# Patient Record
Sex: MALE | HISPANIC OR LATINO | Employment: FULL TIME | URBAN - METROPOLITAN AREA
[De-identification: names, ages, dates, MRNs, and addresses within clinical notes are randomized per-mention and may not be internally consistent; named-entity substitution may affect disease eponyms.]

---

## 2017-02-08 ENCOUNTER — HOSPITAL ENCOUNTER (OUTPATIENT)
Dept: LAB | Facility: MEDICAL CENTER | Age: 57
End: 2017-02-08
Attending: FAMILY MEDICINE
Payer: COMMERCIAL

## 2017-02-08 LAB
ALBUMIN SERPL BCP-MCNC: 4.2 G/DL (ref 3.2–4.9)
ALBUMIN/GLOB SERPL: 1.7 G/DL
ALP SERPL-CCNC: 51 U/L (ref 30–99)
ALT SERPL-CCNC: 31 U/L (ref 2–50)
ANION GAP SERPL CALC-SCNC: 6 MMOL/L (ref 0–11.9)
AST SERPL-CCNC: 27 U/L (ref 12–45)
BASOPHILS # BLD AUTO: 0.05 K/UL (ref 0–0.12)
BASOPHILS NFR BLD AUTO: 0.7 % (ref 0–1.8)
BILIRUB SERPL-MCNC: 0.3 MG/DL (ref 0.1–1.5)
BUN SERPL-MCNC: 25 MG/DL (ref 8–22)
CALCIUM SERPL-MCNC: 9.6 MG/DL (ref 8.5–10.5)
CHLORIDE SERPL-SCNC: 103 MMOL/L (ref 96–112)
CHOLEST SERPL-MCNC: 162 MG/DL (ref 100–199)
CO2 SERPL-SCNC: 26 MMOL/L (ref 20–33)
CREAT SERPL-MCNC: 0.99 MG/DL (ref 0.5–1.4)
EOSINOPHIL # BLD: 0.35 K/UL (ref 0–0.51)
EOSINOPHIL NFR BLD AUTO: 4.7 % (ref 0–6.9)
ERYTHROCYTE [DISTWIDTH] IN BLOOD BY AUTOMATED COUNT: 43.6 FL (ref 35.9–50)
GLOBULIN SER CALC-MCNC: 2.5 G/DL (ref 1.9–3.5)
GLUCOSE SERPL-MCNC: 107 MG/DL (ref 65–99)
HCT VFR BLD AUTO: 36.2 % (ref 42–52)
HDLC SERPL-MCNC: 51 MG/DL
HGB BLD-MCNC: 11.7 G/DL (ref 14–18)
IMM GRANULOCYTES # BLD AUTO: 0.02 K/UL (ref 0–0.11)
IMM GRANULOCYTES NFR BLD AUTO: 0.3 % (ref 0–0.9)
LDLC SERPL CALC-MCNC: 95 MG/DL
LYMPHOCYTES # BLD: 2.55 K/UL (ref 1–4.8)
LYMPHOCYTES NFR BLD AUTO: 34 % (ref 22–41)
MCH RBC QN AUTO: 29.3 PG (ref 27–33)
MCHC RBC AUTO-ENTMCNC: 32.3 G/DL (ref 33.7–35.3)
MCV RBC AUTO: 90.7 FL (ref 81.4–97.8)
MONOCYTES # BLD: 0.52 K/UL (ref 0–0.85)
MONOCYTES NFR BLD AUTO: 6.9 % (ref 0–13.4)
NEUTROPHILS # BLD: 4.02 K/UL (ref 1.82–7.42)
NEUTROPHILS NFR BLD AUTO: 53.4 % (ref 44–72)
NRBC # BLD AUTO: 0 K/UL
NRBC BLD-RTO: 0 /100 WBC
PLATELET # BLD AUTO: 244 K/UL (ref 164–446)
PMV BLD AUTO: 12.5 FL (ref 9–12.9)
POTASSIUM SERPL-SCNC: 4.1 MMOL/L (ref 3.6–5.5)
PROT SERPL-MCNC: 6.7 G/DL (ref 6–8.2)
RBC # BLD AUTO: 3.99 M/UL (ref 4.7–6.1)
SODIUM SERPL-SCNC: 135 MMOL/L (ref 135–145)
TRIGL SERPL-MCNC: 81 MG/DL (ref 0–149)
WBC # BLD AUTO: 7.5 K/UL (ref 4.8–10.8)

## 2017-02-08 PROCEDURE — 85025 COMPLETE CBC W/AUTO DIFF WBC: CPT

## 2017-02-08 PROCEDURE — 80061 LIPID PANEL: CPT

## 2017-02-08 PROCEDURE — 36415 COLL VENOUS BLD VENIPUNCTURE: CPT

## 2017-02-08 PROCEDURE — 80053 COMPREHEN METABOLIC PANEL: CPT

## 2017-08-15 ENCOUNTER — APPOINTMENT (OUTPATIENT)
Dept: RADIOLOGY | Facility: MEDICAL CENTER | Age: 57
End: 2017-08-15
Attending: EMERGENCY MEDICINE
Payer: COMMERCIAL

## 2017-08-15 ENCOUNTER — HOSPITAL ENCOUNTER (EMERGENCY)
Facility: MEDICAL CENTER | Age: 57
End: 2017-08-15
Attending: EMERGENCY MEDICINE
Payer: COMMERCIAL

## 2017-08-15 VITALS
HEART RATE: 76 BPM | HEIGHT: 67 IN | RESPIRATION RATE: 14 BRPM | DIASTOLIC BLOOD PRESSURE: 87 MMHG | TEMPERATURE: 98.2 F | OXYGEN SATURATION: 99 % | BODY MASS INDEX: 27.4 KG/M2 | WEIGHT: 174.6 LBS | SYSTOLIC BLOOD PRESSURE: 159 MMHG

## 2017-08-15 DIAGNOSIS — S81.012A LACERATION OF KNEE, LEFT, INITIAL ENCOUNTER: ICD-10-CM
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
## 2017-08-15 DIAGNOSIS — S40.812A ABRASION OF ARM, LEFT, INITIAL ENCOUNTER: ICD-10-CM

## 2017-08-15 DIAGNOSIS — S09.90XA CHI (CLOSED HEAD INJURY), INITIAL ENCOUNTER: ICD-10-CM

## 2017-08-15 DIAGNOSIS — S01.01XA LACERATION OF SCALP, INITIAL ENCOUNTER: ICD-10-CM

## 2017-08-15 LAB — GLUCOSE BLD-MCNC: 138 MG/DL (ref 65–99)

## 2017-08-15 PROCEDURE — 700111 HCHG RX REV CODE 636 W/ 250 OVERRIDE (IP): Performed by: EMERGENCY MEDICINE

## 2017-08-15 PROCEDURE — 303747 HCHG EXTRA SUTURE

## 2017-08-15 PROCEDURE — 303485 HCHG DRESSING MEDIUM

## 2017-08-15 PROCEDURE — 700102 HCHG RX REV CODE 250 W/ 637 OVERRIDE(OP): Performed by: EMERGENCY MEDICINE

## 2017-08-15 PROCEDURE — 700101 HCHG RX REV CODE 250: Performed by: EMERGENCY MEDICINE

## 2017-08-15 PROCEDURE — 73590 X-RAY EXAM OF LOWER LEG: CPT | Mod: LT

## 2017-08-15 PROCEDURE — 82962 GLUCOSE BLOOD TEST: CPT

## 2017-08-15 PROCEDURE — 73030 X-RAY EXAM OF SHOULDER: CPT | Mod: RT

## 2017-08-15 PROCEDURE — A9270 NON-COVERED ITEM OR SERVICE: HCPCS | Performed by: EMERGENCY MEDICINE

## 2017-08-15 PROCEDURE — 70450 CT HEAD/BRAIN W/O DYE: CPT

## 2017-08-15 PROCEDURE — 500002 HCHG ADHESIVE, DERMABOND: Performed by: EMERGENCY MEDICINE

## 2017-08-15 PROCEDURE — 90471 IMMUNIZATION ADMIN: CPT

## 2017-08-15 PROCEDURE — 90715 TDAP VACCINE 7 YRS/> IM: CPT | Performed by: EMERGENCY MEDICINE

## 2017-08-15 PROCEDURE — 99284 EMERGENCY DEPT VISIT MOD MDM: CPT

## 2017-08-15 PROCEDURE — 304999 HCHG REPAIR-SIMPLE/INTERMED LEVEL 1

## 2017-08-15 PROCEDURE — 304217 HCHG IRRIGATION SYSTEM

## 2017-08-15 RX ORDER — HYDROCODONE BITARTRATE AND ACETAMINOPHEN 5; 325 MG/1; MG/1
1 TABLET ORAL EVERY 4 HOURS PRN
Qty: 20 TAB | Refills: 0 | Status: SHIPPED | OUTPATIENT
Start: 2017-08-15 | End: 2021-09-09

## 2017-08-15 RX ORDER — ONDANSETRON 4 MG/1
4 TABLET, ORALLY DISINTEGRATING ORAL EVERY 8 HOURS PRN
Qty: 10 TAB | Refills: 0 | Status: SHIPPED | OUTPATIENT
Start: 2017-08-15 | End: 2021-09-09

## 2017-08-15 RX ORDER — LIDOCAINE HYDROCHLORIDE 10 MG/ML
20 INJECTION, SOLUTION INFILTRATION; PERINEURAL ONCE
Status: COMPLETED | OUTPATIENT
Start: 2017-08-15 | End: 2017-08-15

## 2017-08-15 RX ORDER — HYDROCODONE BITARTRATE AND ACETAMINOPHEN 5; 325 MG/1; MG/1
1 TABLET ORAL ONCE
Status: COMPLETED | OUTPATIENT
Start: 2017-08-15 | End: 2017-08-15

## 2017-08-15 RX ADMIN — CLOSTRIDIUM TETANI TOXOID ANTIGEN (FORMALDEHYDE INACTIVATED), CORYNEBACTERIUM DIPHTHERIAE TOXOID ANTIGEN (FORMALDEHYDE INACTIVATED), BORDETELLA PERTUSSIS TOXOID ANTIGEN (GLUTARALDEHYDE INACTIVATED), BORDETELLA PERTUSSIS FILAMENTOUS HEMAGGLUTININ ANTIGEN (FORMALDEHYDE INACTIVATED), BORDETELLA PERTUSSIS PERTACTIN ANTIGEN, AND BORDETELLA PERTUSSIS FIMBRIAE 2/3 ANTIGEN 0.5 ML: 5; 2; 2.5; 5; 3; 5 INJECTION, SUSPENSION INTRAMUSCULAR at 20:09

## 2017-08-15 RX ADMIN — LIDOCAINE HYDROCHLORIDE 20 ML: 10 INJECTION, SOLUTION INFILTRATION; PERINEURAL at 19:45

## 2017-08-15 RX ADMIN — HYDROCODONE BITARTRATE AND ACETAMINOPHEN 1 TABLET: 5; 325 TABLET ORAL at 20:09

## 2017-08-15 NOTE — ED AVS SNAPSHOT
Dedicated Devices Access Code: [email or web address]  Expires: 9/14/2017  8:40 PM    Your email address is not on file at [facility].  Email Addresses are required for you to sign up for [employer]icated Devices, please contact [phone] to verify your personal information and to provide your email address prior to attempting to register for Dedicated Devices.    Odilon Gan  [address] [place] [address]    Dedicated Devices  A secure, online tool to manage your health information     [facility]’s Dedicated Devices® is a secure, online tool that connects you to your personalized health information from the privacy of your home -- day or night - making it very easy for you to manage your healthcare. Once the activation process is completed, you can even access your medical information using the Dedicated Devices rachel, which is available for free in the Apple Rachel store or Google Play store.     To learn more about Dedicated Devices, visit [email or web address]    There are two levels of access available (as shown below):   My Chart Features  [facility] Primary Care Doctor [facility]  Specialists [facility]  Urgent  Care Non-[facility] Primary Care Doctor   Email your healthcare team securely and privately 24/7 X X X    Manage appointments: schedule your next appointment; view details of past/upcoming appointments X      Request prescription refills. X      View recent personal medical records, including lab and immunizations X X X X   View health record, including health history, allergies, medications X X X X   Read reports about your outpatient visits, procedures, consult and ER notes X X X X   See your discharge summary, which is a recap of your hospital and/or ER visit that includes your diagnosis, lab results, and care plan X X  X     How to register for [employer]:  Once your e-mail address has been verified, follow the following steps to sign up for [employer].     1. Go to  [email or web address]  2. Click on the Sign Up Now box, which takes you to the New Member Sign Up page. You will need  to provide the following information:  a. Enter your [employer] Access Code exactly as it appears at the top of this page. (You will not need to use this code after you’ve completed the sign-up process. If you do not sign up before the expiration date, you must request a new code.)   b. Enter your date of birth.   c. Enter your home email address.   d. Click Submit, and follow the next screen’s instructions.  3. Create a [employer] ID. This will be your [employer] login ID and cannot be changed, so think of one that is secure and easy to remember.  4. Create a [employer] password. You can change your password at any time.  5. Enter your Password Reset Question and Answer. This can be used at a later time if you forget your password.   6. Enter your e-mail address. This allows you to receive e-mail notifications when new information is available in [employer].  7. Click Sign Up. You can now view your health information.    For assistance activating your [employer] account, call [phone]

## 2017-08-15 NOTE — ED AVS SNAPSHOT
Home Care Instructions                                                                                                                Odilon Gan   MRN: [id number]    Department:  [facility], Emergency Dept   Date of Visit:  8/15/2017            [facility], Emergency Dept    [address]    [place] [address]    Phone:  [phone]      You were seen by     Renetta Wasserman M.D.      Your Diagnosis Was     Fall, initial encounter     W19.XXXA       These are the medications you received during your hospitalization from 08/15/2017 1846 to 08/15/2017 2040     Date/Time Order Dose Route Action    08/15/2017 1945 lidocaine (XYLOCAINE) 1 % injection 20 mL Infiltration Given    08/15/2017 2009 tetanus-dipth-acell pertussis (ADACEL) inj 0.5 mL 0.5 mL Intramuscular Given    08/15/2017 2009 hydrocodone-acetaminophen (NORCO) 5-325 MG per tablet 1 Tab 1 Tab Oral Given      Follow-up Information     1. Follow up with Michael Rowe M.D.. Call in 2 days.    Specialty:  Family Medicine    Why:  for recheck    Contact information    [address] [facility] [address] [phone]        Medication Information     Review all of your home medications and newly ordered medications with your primary doctor and/or pharmacist as soon as possible. Follow medication instructions as directed by your doctor and/or pharmacist.     Please keep your complete medication list with you and share with your physician. Update the information when medications are discontinued, doses are changed, or new medications (including over-the-counter products) are added; and carry medication information at all times in the event of emergency situations.               Medication List      START taking these medications        Instructions    Morning Afternoon Evening Bedtime    hydrocodone-acetaminophen 5-325 MG Tabs per tablet   Last time this was given:  1 Tab on 8/15/2017  8:09 PM   Commonly known as:  NORCO        Take 1 Tab by mouth every four hours as needed.   Dose:  1 Tab                        ondansetron 4 MG Tbdp   Commonly known as:  ZOFRAN ODT        Take 1 Tab by mouth every 8 hours as needed for Nausea/Vomiting.   Dose:  4 mg                          ASK your doctor about these medications        Instructions    Morning Afternoon Evening Bedtime    aspirin effervescent 325 MG Tbef   Commonly known as:  EVGENY-SELTZER        Take 1 Tab by mouth every 6 hours as needed.   Dose:  1 Tab                        LEVEMIR 100 UNIT/ML Soln   Generic drug:  insulin detemir        Inject 10 Units as instructed every evening.   Dose:  10 Units                        lisinopril 20 MG Tabs   Commonly known as:  PRINIVIL        Take 20 mg by mouth every day.   Dose:  20 mg                        lorazepam 1 MG Tabs   Commonly known as:  ATIVAN        Take 1 Tab by mouth every four hours as needed (vertigo).   Dose:  1 mg                        meclizine 25 MG Tabs   Commonly known as:  ANTIVERT        Take 1 Tab by mouth 3 times a day as needed for Dizziness, Nausea/Vomiting and Vertigo.   Dose:  25 mg                        NOVOLIN L SC        Inject  as instructed.                        promethazine 25 MG Tabs   Commonly known as:  PHENERGAN        Take 1 Tab by mouth every 6 hours as needed for Nausea/Vomiting.   Dose:  25 mg                        simvastatin 80 MG tablet   Commonly known as:  ZOCOR        Take 80 mg by mouth every evening.   Dose:  80 mg                             Where to Get Your Medications      You can get these medications from any pharmacy     Bring a paper prescription for each of these medications    - hydrocodone-acetaminophen 5-325 MG Tabs per tablet  - ondansetron 4 MG Tbdp            Procedures and tests performed during your visit     ACCU-CHEK GLUCOSE    ADHESIVE, DERMABOND    CT-HEAD W/O    DISPOSABLE SUTURE SET    DX-SHOULDER 2+ RIGHT    DX-TIBIA AND FIBULA LEFT    WOUND IRRIGATION             Discharge Instructions       Conmoción cerebral - Adultos  (Concussion, Adult)  Calin conmoción cerebral, o traumatismo cerebral cerrado, es calin lesión cerebral causada por un golpe directo en la kevyn o por un movimiento rápido y brusco (sacudida) de la kevyn o el petrona. Generalmente no pone en peligro la kae. Aún así, los efectos de calin conmoción cerebral pueden ser graves. Si sufrió calin conmoción cerebral antes, es más probable que experimente síntomas similares a calin conmoción cerebral después de un golpe directo en la kevyn.   CAUSAS   · Un golpe directo en la kevyn, dorothy al chocar contra otro jugador en un partido de fútbol, recibir un golpe en calin kolby o golpearse la kevyn contra calin superficie dura.  · Calin sacudida de la kevyn o el petrona que hace que el cerebro se mueva hacia adelante y hacia atrás dentro del cráneo, dorothy en un choque automovilístico.  SIGNOS Y SÍNTOMAS   Los signos de calin conmoción cerebral pueden ser difíciles de determinar. En un primer momento, los pacientes, familiares y profesionales cuco vez no los adviertan. Puede ser que aparentemente esté normal willa que actúe o se sienta diferente.  Por lo general, los síntomas son transitorios willa pueden durar algunos días, semanas o aún más. Algunos síntomas pueden aparecer inmediatamente mientras otros pueden manifestarse después de algunas horas o días. Cada lesión en la kevyn es diferente. Los síntomas son:   · Dolor de kevyn leve a moderado, que no se jonny.  · Sensación de presión dentro de la kevyn.  · Presentar más dificultad que lo habitual para:  · Aprender o recordar cosas que ha escuchado.  · Responder preguntas.  · Prestar atención o concentrarse.  · Organizar las tareas diarias.  · Arvin decisiones y resolver problemas.  · Lentitud para pensar, actuar o reaccionar, hablar o leer.  · Sentirse perdido o confundirse con facilidad.  · Sentirse cansado todo el tiempo o con falta de energía (fatigado).  · Sentirse  somnoliento.  · Trastornos del sueño.  · Dormir más que lo habitual.  · Dormir menos que lo habitual.  · Problemas para conciliar el sueño.  · Problemas para dormir (insomnio).  · Pérdida del equilibrio, sensación de mareo.  · Náuseas o vómitos.  · Adormecimiento u hormigueo.  · Mayor sensibilidad para:  · Los sonidos.  · Las luces.  · Distracciones.  · Problemas visuales o cansancio en los ojos.  · Disminución del sentido del gusto o el olfato.  · Zumbidos en los oídos.  · Cambios de humor, dorothy sentirse yas o ansioso.  · Irritación o enojo por cosas pequeñas o sin motivos.  · Falta de motivación.  · Omar o escuchar cosas que otras personas no jensen ni escuchan (alucinaciones).  DIAGNÓSTICO   El médico diagnosticará calin conmoción cerebral basándose en la descripción de la lesión y los síntomas. Le preguntará si se desmayó (perdió el conocimiento) y si tiene dificultad para recordar los sucesos ocurridos inmediatamente antes y christian el traumatismo.   La evaluación también puede incluir:   · Un escaneo cerebral para encontrar signos de lesión cerebral. Aunque los estudios no muestren lesiones, igual puede zenaida sufrido calin conmoción cerebral.  · Análisis de jaxon para asegurarse de que no hay otros problemas.  TRATAMIENTO   · La mayor parte de las conmociones se tratan en el servicio de emergencias, el área de atención de urgencia o el consultorio médico. Es posible que deba permanecer en el hospital christian la noche para completar el tratamiento.  · Informe a giraldo médico si masha medicamentos, incluidos los medicamentos recetados, medicamentos de venta hedy y cami naturales. Algunos medicamentos, dorothy los anticoagulantes y la aspirina, pueden aumentar la probabilidad de sufrir complicaciones. También informe a giraldo médico si ha bebido alcohol o consume drogas. Esta información puede afectar el tratamiento.  · El médico le dará el christiana con instrucciones importantes que deberá seguir.  · La velocidad de  recuperación de calin conmoción cerebral depende de varios factores. Entre ellos se incluyen la gravedad de la conmoción cerebral, la cinthia del cerebro lesionada, la edad y el estado de dennys previo a la conmoción cerebral.  · La mayoría de las personas que rodriguez sufrido calin lesión leve se recuperan completamente. La recuperación puede llevar tiempo. En general, la recuperación es más lenta en las personas mayores. Además, a aquellas personas que ya rodriguez sufrido calin conmoción cerebral en el pasado les llevará más tiempo recuperarse de la lesión actual.  INSTRUCCIONES PARA EL CUIDADO EN EL HOGAR   Instrucciones generales  · Siga cuidadosamente las indicaciones que giraldo médico le ha dado.  · Utilice los medicamentos de venta hedy o recetados para calmar el dolor, el malestar o la fiebre, según se lo indique el médico.  · [place] sólo los medicamentos que giraldo médico le haya autorizado.  · No amy alcohol hasta que giraldo médico lo autorice. El alcohol y algunas otras sustancias pueden demorar giraldo recuperación y ponerlo en riesgo de nuevas lesiones.  · Si le resulta más difícil que lo habitual recordar las cosas, escríbalas.  · Trate de hacer calin cosa por vez si se distrae con facilidad. Por ejemplo, no juju televisión mientras prepara la lizette.  · Consulte con familiares y amigos si debe keith decisiones importantes.  · Cumpla con todas las visitas de control. Se recomienda realizar varias evaluaciones de los síntomas para favorecer giraldo recuperación.  · Controle noelle síntomas y pídale a otras personas que también lo raquel. En algunos casos pueden aparecer complicaciones luego de calin conmoción cerebral. Los adultos mayores con lesión cerebral pueden tener un mayor riesgo de complicaciones graves, dorothy un coágulo sanguíneo en el cerebro.  · Informe a los maestros, el departamento de enfermería de la escuela, el consejero escolar, el entrenador o director acerca de giraldo lesión, los síntomas y las restricciones que tiene. Hágales saber lo que  puede y lo que no puede hacer. Ellos deberán observar:  ¨ Aumento en los problemas de atención o concentración.  ¨ Más dificultades para recordar o aprender información nueva.  ¨ Aumento del tiempo que necesita para completar tareas o encargos.  ¨ Aumento de la irritabilidad o disminución de la capacidad para enfrentar el estrés.  ¨ Aparición de nuevos síntomas.  · Mikael reposo. El descanso favorece la curación del cerebro. Asegúrese de que:  ¨ [place] yamil por la noche. Evite quedarse despierto muy tarde por la noche.  ¨ Se va a dormir a la misma hora los días de semana y los fines de semana.  ¨ Descanse christian el día. Stefanie siestas christian el día o momentos de descanso cuando se siente cansado.  · Limita las actividades que requieren mucha atención o concentración. Estas pueden ser:  ¨ Tareas para el hogar o trabajos relacionados con el empleo.  ¨ Mirar televisión.  ¨ Trabajar en la computadora.  · Evite toda situación en la que se pudiera producir otra lesión cerebral (fútbol americano, hockey, fútbol, básquet, artes marciales, deportes de henrry natalio abajo y andar a collin). El problema empeorará cada vez que experimente calin conmoción cerebral. Debe evitar estas actividades hasta que sea evaluado por el médico correspondiente.  Regreso a las actividades habituales  Debe volver a noelle actividades habituales gradualmente, no de calin rekha vez. Debe darles al cuerpo y el cerebro giraldo tiempo para recuperarse.  · No retome la práctica de deportes ni otras actividades deportivas hasta que giraldo médico le diga que puede hacerlo.  · Consulte a giraldo médico sobre cuándo puede conducir automóviles, andar en bicicleta u operar máquinas pesadas. La capacidad para reaccionar puede ser más lenta luego de cailn lesión cerebral. Nunca realice estas actividades si se siente mareado.  · Pregunte a giraldo médico cuando podrá volver a la escuela o al trabajo.  Prevención de otra conmoción cerebral  Es muy importante que evite otra lesión cerebral,  especialmente antes de que se haya recuperado. En casos raros, calin nueva lesión puede causar daños cerebrales permanentes, hinchazón del cerebro o la muerte. El riesgo es mayor christian los primeros 7 a 10 días después de calin lesión en la kevyn. Evite las lesiones:   · Use el cinturón de seguridad al conducir un automóvil.  · [place] alcohol solo con moderación.  · Use un adan cuando tonja en bicicleta, esquíe, patine o realice actividades similares.  · Evite actividades que podrían causar calin segunda conmoción cerebral, dorothy deportes de contacto o recreativos hasta que giraldo médico lo autorice.  · Implemente medidas de seguridad en el hogar.  ¨ Evite el desorden y objetos que puedan ser peligrosos en pisos y escaleras.  ¨ Coloque barras en los yovana y pasamanos en las escaleras.  ¨ Ponga alfombras antideslizantes en pisos y bañeras.  ¨ Mejore la iluminación en zonas de penumbra.  SOLICITE ATENCIÓN MÉDICA SI:   · Aumentan noelle problemas de atención o concentración.  · Aumentan noelle dificultades para recordar o aprender información nueva.  · Necesita más tiempo que antes para completar las tareas o asignaciones.  · Aumenta la irritabilidad o disminuye la capacidad para enfrentar el estrés.  · Tiene más síntomas que antes.  Solicite atención médica si presenta alguno de los siguientes síntomas christian más de 2 semanas después de giraldo lesión:   · Dolor de kevyn que perdura (crónico).  · Mareos o problemas con el equilibrio.  · Náuseas.  · Problemas de visión.  · Aumento de la sensibilidad a los ruidos o la aleida.  · Depresión y cambios en el estado de ánimo.  · Ansiedad o irritabilidad.  · Problemas de memoria.  · Dificultad para concentrarse o mantener la atención.  · Problemas para dormir.  · Cansancio permanente.  SOLICITE ATENCIÓN MÉDICA DE INMEDIATO SI:   · Los shima de kevyn son intensos o empeoran. [place] puede ser un signo de que hay un coágulo sanguíneo en la kevyn.  · Siente debilidad (aun si es solo en calin mano, calin  pierna o parte del pearl).  · Siente entumecimiento.  · Le falta coordinación.  · Vomita repetidas veces.  · Está más somnoliento.  · Calin pupila está más carolann que la otra.  · Tiene convulsiones.  · Tiene dificultad para hablar.  · Siente calin confusión que va en aumento. [place] puede ser un signo de que hay un coágulo sanguíneo en el cerebro.  · Aumenta la intranquilidad, la agitación o la irritabilidad.  · No puede reconocer personas o lugares.  · Siente dolor en el petrona.  · Le resulta difícil despertarse.  · Tiene cambios de conducta inusuales.  · Pierde la conciencia.  ASEGÚRESE DE QUE:   · Comprende estas instrucciones.  · Controlará giraldo afección.  · Recibirá ayuda de inmediato si no mejora o si empeora.     Esta información no tiene dorothy fin reemplazar el consejo del médico. Asegúrese de hacerle al médico cualquier pregunta que tenga.     Document Released: 11/30/2009 Document Revised: 01/08/2016  [employer] Interactive Patient Education ©2016 [employer]    Cuidado de un desgarro en los adultos  (Laceration Care, Adult)  Un desgarro es un halley que atraviesa todas las capas de piel. El halley también llega al tejido que está debajo de la piel. Algunos carrera cicatrizan por sí solos. Otros se deben cerrar con puntos (suturas), grapas, tiras adhesivas para la piel o adhesivo para heridas. El cuidado del halley reduce el riesgo de infección y ayuda a calin mejor cicatrización.  CÓMO CUIDAR DEL HALLEY  Para los puntos o las grapas, maria g lo siguiente:  · Mantenga la herida limpia y seca.  · Si le colocaron calin venda (vendaje), debe cambiarla al menos calin vez al día o dorothy se lo haya indicado el médico. También debe cambiarla si se moja o se ensucia.  · Mantenga la herida completamente seca christian las primeras 24 horas o dorothy se lo haya indicado el médico. Transcurrido christopher tiempo, puede ducharse o keith un baño de inmersión. No obstante, asegúrese de no sumergir la herida en agua hasta que le hayan quitado los puntos o  las grapas.  · Limpie la herida calin vez al día o dorothy se lo haya indicado el médico:  ¨ Lave la herida con agua y jabón.  ¨ Enjuague la herida con agua hasta sacar todo el jabón.  ¨ Seque dando palmaditas con calin toalla limpia. No frote la herida.  · Después de limpiar la herida, aplique calin capa delgada de ungüento con antibiótico dorothy se lo haya indicado el médico. El ungüento se aplica con estos fines:  ¨ Ayuda a prevenir calin infección.  ¨ [place] que la venda se adhiera a la herida.  · Concurra para que le retiren los puntos cuando el médico le indique.  Si el médico utilizó tiras adhesivas:   · Mantenga la herida limpia y seca.  · Si le colocaron calin venda, debe cambiarla al menos calin vez al día o dorothy se lo haya indicado el médico. También debe cambiarla si se ensucia o se moja.  · No deje que las tiras adhesivas se mojen. Puede bañarse o ducharse, willa tenga cuidado de no mojar la herida.  · Si se moja, séquela dando palmaditas con calin toalla limpia. No frote la herida.  · Las tiras adhesivas se caen solas. Puede recortar las tiras a medida que la herida cicatriza. No quite las tiras que aún están pegadas a la herida. Se caerán después de un tiempo.  Si el médico utilizó adhesivo para heridas:  · Trate de mantener la herida seca; sin embargo, puede mojarla ligeramente cuando se bañe o se duche. No sumerja la herida en el agua, por ejemplo, al nadar.  · Después de ducharse o bañarse, seque la herida con cuidado dando palmaditas con calin toalla limpia. No frote la herida.  · No practique actividades que lo raquel transpirar mucho hasta que el adhesivo se haya salido solo.  · No aplique líquidos, cremas ni ungüentos medicinales en la herida mientras esté el adhesivo.  · Si le colocaron calin venda, debe cambiarla al menos calin vez al día o dorothy se lo haya indicado el médico. También debe cambiarla si se ensucia o se moja.  · Si le colocan calin venda sobre la herida, no deje que la cinta de la venda toque el  adhesivo.  · No toque el adhesivo. El adhesivo suele permanecer en la piel de 5 a 10 días. Luego, se sale solo.  Instrucciones generales   · Para ayudar a evitar la formación de cicatrices, cúbrase la herida con pantalla solar siempre que esté al aire hedy después de que le hayan retirado los puntos o las tiras adhesivas o cuando todavía tenga el adhesivo en la piel y la herida haya cicatrizado. Use calin pantalla solar con factor de protección solar (FPS) de por lo menos 30.  · [place] los medicamentos de venta hedy y los recetados solamente dorothy se lo haya indicado el médico.  · Si le indicaron un ungüento o un medicamento con antibiótico, aplíquelo o tómelo dorothy se lo haya dicho el médico. No deje de usar el antibiótico aunque la herida esté mejorando.  · No se rasque ni se toque la herida.  · Concurra a todas las visitas de control dorothy se lo haya indicado el médico. [place] es importante.  · Controle la herida todos los días para detectar signos de infección. Esté atento a lo siguiente:  ¨ Dolor, hinchazón o enrojecimiento.  ¨ Líquido, jaxon o pus.  · Cuando esté sentado o acostado, eleve la cinthia de la lesión por encima del nivel del corazón, si es posible.  SOLICITE AYUDA SI:  · Recibió calin vacuna antitetánica y tiene cualquiera de estos problemas en el sitio de la inyección:  ¨ Hinchazón.  ¨ Dolor intenso.  ¨ Enrojecimiento.  ¨ Hemorragia.  · Tiene fiebre.  · La herida estaba cerrada y se abre.  · Percibe que sale mal olor de la herida o de la venda.  · Nota un cuerpo extraño en la herida, dorothy un trozo de peña o chiquita.  · Los medicamentos no le alivian el dolor.  · Tiene más enrojecimiento, hinchazón o dolor en el lugar de la herida.  · Observa líquido, jaxon o pus que salen de la herida.  · Observa que la piel cerca de la herida cambia de color.  · Debe cambiar la venda con frecuencia debido a que hay secreción de líquido, jaxon o pus de la herida.  · Tiene calin erupción cutánea nueva.  · Comienza a tener  entumecimiento alrededor de la herida.  SOLICITE AYUDA DE INMEDIATO SI:  · Hay mucha hinchazón alrededor de la herida.  · El dolor empeora repentinamente y es muy intenso.  · Tiene bultos dolorosos cerca de la herida o en la piel de cualquier parte del cuerpo.  · Tiene calin línea carmen que sale de la herida.  · La herida está en la mano o en el pie y no puede  cherelle los dedos con normalidad.  · La herida está en la mano o en el pie y observa que los dedos tienen un luis pálido o azulado.     Esta información no tiene dorothy fin reemplazar el consejo del médico. Asegúrese de hacerle al médico cualquier pregunta que tenga.     Document Released: 08/15/2012 Document Revised: 05/03/2016  [employer] Interactive Patient Education ©2016 [employer]            Patient Information     Patient Information    Following emergency treatment: all patient requiring follow-up care must return either to a private physician or a clinic if your condition worsens before you are able to obtain further medical attention, please return to the emergency room.     Billing Information    At [facility], we work to make the billing process streamlined for our patients.  Our Representatives are here to answer any questions you may have regarding your hospital bill.  If you have insurance coverage and have supplied your insurance information to us, we will submit a claim to your insurer on your behalf.  Should you have any questions regarding your bill, we can be reached online or by phone as follows:  Online: You are able pay your bills online or live chat with our representatives about any billing questions you may have. We are here to help Monday - Friday from 8:00am to 7:30pm and 9:00am - 12:00pm on Saturdays.  Please visit [email or web address]  for more information.   Phone:  [phone] or [phone]    Please note that your emergency physician, surgeon, pathologist, radiologist, anesthesiologist,  and other specialists are not employed by [facility] and will therefore bill separately for their services.  Please contact them directly for any questions concerning their bills at the numbers below:     Emergency Physician Services:  [phone]  [facility]:  [phone]  [facility]:  [phone]  [facility]:  [phone]    1. Your final bill may vary from the amount quoted upon discharge if all procedures are not complete at that time, or if your doctor has additional procedures of which we are not aware. You will receive an additional bill if you return to the Emergency Department at [facility] for suture removal regardless of the facility of which the sutures were placed.     2. Please arrange for settlement of this account at the emergency registration.    3. All self-pay accounts are due in full at the time of treatment.  If you are unable to meet this obligation then payment is expected within 4-5 days.     4. If you have had radiology studies (CT, X-ray, Ultrasound, MRI), you have received a preliminary result during your emergency department visit. Please contact the radiology department [phone] to receive a copy of your final result. Please discuss the Final result with your primary physician or with the follow up physician provided.     Crisis Hotline:  [place] Crisis Hotline:  0-246-BKLZYJX or 1-421.365.7131  [place] Crisis Hotline:    1-269.898.9771 or 444-861-8815         ED Discharge Follow Up Questions    1. In order to provide you with very good care, we would like to follow up with a phone call in the next few days.  May we have your permission to contact you?     YES /  NO    2. What is the best phone number to call you? (       )_____-__________    3. What is the best time to call you?      Morning  /  Afternoon  /  Evening                   Patient Signature:  ____________________________________________________________    Date:   ____________________________________________________________

## 2017-08-15 NOTE — ED AVS SNAPSHOT
8/15/2017    Odilon Gan  [address] [place] [address]    Dear Odilon:    [facility] wants to ensure your discharge home is safe and you or your loved ones have had all of your questions answered regarding your care after you leave the hospital.    Below is a list of resources and contact information should you have any questions regarding your hospital stay, follow-up instructions, or active medical symptoms.    Questions or Concerns Regarding… Contact   Medical Questions Related to Your Discharge  (7 days a week, 8am-5pm) Contact a Nurse Care Coordinator   [phone]   Medical Questions Not Related to Your Discharge  (24 hours a day / 7 days a week)  Contact the Nurse Health Line   [phone]    Medications or Discharge Instructions Refer to your discharge packet   or contact your [facility] Primary Care Provider   [phone]   Follow-up Appointment(s) Schedule your appointment via [employer]   or contact Scheduling [phone]   Billing Review your statement via [employer]  or contact Billing [phone]   Medical Records Review your records via [employer]   or contact Medical Records [phone]     You may receive a telephone call within two days of discharge. This call is to make certain you understand your discharge instructions and have the opportunity to have any questions answered. You can also easily access your medical information, test results and upcoming appointments via the [employer] free online health management tool. You can learn more and sign up at [employer]. For assistance setting up your [employer] account, please call [phone].    Once again, we want to ensure your discharge home is safe and that you have a clear understanding of any next steps in your care. If you have any questions or concerns, please do not hesitate to contact us, we are here for you. Thank you for choosing [facility] for your healthcare needs.    Sincerely,    Your [facility] Healthcare Team

## 2017-08-16 NOTE — DISCHARGE INSTRUCTIONS
Conmoción cerebral - Adultos  (Concussion, Adult)  Calin conmoción cerebral, o traumatismo cerebral cerrado, es calin lesión cerebral causada por un golpe directo en la kevyn o por un movimiento rápido y brusco (sacudida) de la kevyn o el petrona. Generalmente no pone en peligro la kae. Aún así, los efectos de calin conmoción cerebral pueden ser graves. Si sufrió calin conmoción cerebral antes, es más probable que experimente síntomas similares a calin conmoción cerebral después de un golpe directo en la kevyn.   CAUSAS   · Un golpe directo en la kevyn, dorothy al chocar contra otro jugador en un partido de fútbol, recibir un golpe en calin kolby o golpearse la kevyn contra calin superficie dura.  · Calin sacudida de la kevyn o el petrona que hace que el cerebro se mueva hacia adelante y hacia atrás dentro del cráneo, dorothy en un choque automovilístico.  SIGNOS Y SÍNTOMAS   Los signos de calin conmoción cerebral pueden ser difíciles de determinar. En un primer momento, los pacientes, familiares y profesionales cuco vez no los adviertan. Puede ser que aparentemente esté normal willa que actúe o se sienta diferente.  Por lo general, los síntomas son transitorios willa pueden durar algunos días, semanas o aún más. Algunos síntomas pueden aparecer inmediatamente mientras otros pueden manifestarse después de algunas horas o días. Cada lesión en la kevyn es diferente. Los síntomas son:   · Dolor de kevyn leve a moderado, que no se jonny.  · Sensación de presión dentro de la kevyn.  · Presentar más dificultad que lo habitual para:  · Aprender o recordar cosas que ha escuchado.  · Responder preguntas.  · Prestar atención o concentrarse.  · Organizar las tareas diarias.  · Arvin decisiones y resolver problemas.  · Lentitud para pensar, actuar o reaccionar, hablar o leer.  · Sentirse perdido o confundirse con facilidad.  · Sentirse cansado todo el tiempo o con falta de energía (fatigado).  · Sentirse somnoliento.  · Trastornos del  sueño.  · Dormir más que lo habitual.  · Dormir menos que lo habitual.  · Problemas para conciliar el sueño.  · Problemas para dormir (insomnio).  · Pérdida del equilibrio, sensación de mareo.  · Náuseas o vómitos.  · Adormecimiento u hormigueo.  · Mayor sensibilidad para:  · Los sonidos.  · Las luces.  · Distracciones.  · Problemas visuales o cansancio en los ojos.  · Disminución del sentido del gusto o el olfato.  · Zumbidos en los oídos.  · Cambios de humor, dorothy sentirse yas o ansioso.  · Irritación o enojo por cosas pequeñas o sin motivos.  · Falta de motivación.  · Omar o escuchar cosas que otras personas no jensen ni escuchan (alucinaciones).  DIAGNÓSTICO   El médico diagnosticará calin conmoción cerebral basándose en la descripción de la lesión y los síntomas. Le preguntará si se desmayó (perdió el conocimiento) y si tiene dificultad para recordar los sucesos ocurridos inmediatamente antes y christian el traumatismo.   La evaluación también puede incluir:   · Un escaneo cerebral para encontrar signos de lesión cerebral. Aunque los estudios no muestren lesiones, igual puede zenaida sufrido calin conmoción cerebral.  · Análisis de jaxon para asegurarse de que no hay otros problemas.  TRATAMIENTO   · La mayor parte de las conmociones se tratan en el servicio de emergencias, el área de atención de urgencia o el consultorio médico. Es posible que deba permanecer en el hospital christian la noche para completar el tratamiento.  · Informe a giraldo médico si masha medicamentos, incluidos los medicamentos recetados, medicamentos de venta hedy y cami naturales. Algunos medicamentos, dorothy los anticoagulantes y la aspirina, pueden aumentar la probabilidad de sufrir complicaciones. También informe a giraldo médico si ha bebido alcohol o consume drogas. Esta información puede afectar el tratamiento.  · El médico le dará el christiana con instrucciones importantes que deberá seguir.  · La velocidad de recuperación de calin conmoción cerebral  depende de varios factores. Entre ellos se incluyen la gravedad de la conmoción cerebral, la cinthia del cerebro lesionada, la edad y el estado de dennys previo a la conmoción cerebral.  · La mayoría de las personas que rodriguez sufrido calin lesión leve se recuperan completamente. La recuperación puede llevar tiempo. En general, la recuperación es más lenta en las personas mayores. Además, a aquellas personas que ya rodriguez sufrido calin conmoción cerebral en el pasado les llevará más tiempo recuperarse de la lesión actual.  INSTRUCCIONES PARA EL CUIDADO EN EL HOGAR   Instrucciones generales  · Siga cuidadosamente las indicaciones que giraldo médico le ha dado.  · Utilice los medicamentos de venta hedy o recetados para calmar el dolor, el malestar o la fiebre, según se lo indique el médico.  · [employer] sólo los medicamentos que giraldo médico le haya autorizado.  · No amy alcohol hasta que giraldo médico lo autorice. El alcohol y algunas otras sustancias pueden demorar giraldo recuperación y ponerlo en riesgo de nuevas lesiones.  · Si le resulta más difícil que lo habitual recordar las cosas, escríbalas.  · Trate de hacer calin cosa por vez si se distrae con facilidad. Por ejemplo, no juju televisión mientras prepara la lizette.  · Consulte con familiares y amigos si debe keith decisiones importantes.  · Cumpla con todas las visitas de control. Se recomienda realizar varias evaluaciones de los síntomas para favorecer giraldo recuperación.  · Controle noelle síntomas y pídale a otras personas que también lo raquel. En algunos casos pueden aparecer complicaciones luego de calin conmoción cerebral. Los adultos mayores con lesión cerebral pueden tener un mayor riesgo de complicaciones graves, dorothy un coágulo sanguíneo en el cerebro.  · Informe a los maestros, el departamento de enfermería de la escuela, el consejero escolar, el entrenador o director acerca de giraldo lesión, los síntomas y las restricciones que tiene. Hágales saber lo que puede y lo que no puede hacer. Ellos  deberán observar:  ¨ Aumento en los problemas de atención o concentración.  ¨ Más dificultades para recordar o aprender información nueva.  ¨ Aumento del tiempo que necesita para completar tareas o encargos.  ¨ Aumento de la irritabilidad o disminución de la capacidad para enfrentar el estrés.  ¨ Aparición de nuevos síntomas.  · Mikael reposo. El descanso favorece la curación del cerebro. Asegúrese de que:  ¨ Duerme yamil por la noche. Evite quedarse despierto muy tarde por la noche.  ¨ Se va a dormir a la misma hora los días de semana y los fines de semana.  ¨ Descanse christian el día. Stefanie siestas christian el día o momentos de descanso cuando se siente cansado.  · Limita las actividades que requieren mucha atención o concentración. Estas pueden ser:  ¨ Tareas para el hogar o trabajos relacionados con el empleo.  ¨ Mirar televisión.  ¨ Trabajar en la computadora.  · Evite toda situación en la que se pudiera producir otra lesión cerebral (fútbol americano, hockey, fútbol, básquet, artes marciales, deportes de henrry natalio abajo y andar a collin). El problema empeorará cada vez que experimente calin conmoción cerebral. Debe evitar estas actividades hasta que sea evaluado por el médico correspondiente.  Regreso a las actividades habituales  Debe volver a noelle actividades habituales gradualmente, no de calin rekha vez. Debe darles al cuerpo y el cerebro giraldo tiempo para recuperarse.  · No retome la práctica de deportes ni otras actividades deportivas hasta que giraldo médico le diga que puede hacerlo.  · Consulte a giraldo médico sobre cuándo puede conducir automóviles, andar en bicicleta u operar máquinas pesadas. La capacidad para reaccionar puede ser más lenta luego de calin lesión cerebral. Nunca realice estas actividades si se siente mareado.  · Pregunte a giraldo médico cuando podrá volver a la escuela o al trabajo.  Prevención de otra conmoción cerebral  Es muy importante que evite otra lesión cerebral, especialmente antes de que se haya  recuperado. En casos raros, calin nueva lesión puede causar daños cerebrales permanentes, hinchazón del cerebro o la muerte. El riesgo es mayor christian los primeros 7 a 10 días después de calin lesión en la kevyn. Evite las lesiones:   · Use el cinturón de seguridad al conducir un automóvil.  · Xiomara alcohol solo con moderación.  · Use un adan cuando tonja en bicicleta, esquíe, patine o realice actividades similares.  · Evite actividades que podrían causar calin segunda conmoción cerebral, dorothy deportes de contacto o recreativos hasta que giraldo médico lo autorice.  · Implemente medidas de seguridad en el hogar.  ¨ Evite el desorden y objetos que puedan ser peligrosos en pisos y escaleras.  ¨ Coloque barras en los yovana y pasamanos en las escaleras.  ¨ Ponga alfombras antideslizantes en pisos y bañeras.  ¨ Mejore la iluminación en zonas de penumbra.  SOLICITE ATENCIÓN MÉDICA SI:   · Aumentan noelle problemas de atención o concentración.  · Aumentan noelle dificultades para recordar o aprender información nueva.  · Necesita más tiempo que antes para completar las tareas o asignaciones.  · Aumenta la irritabilidad o disminuye la capacidad para enfrentar el estrés.  · Tiene más síntomas que antes.  Solicite atención médica si presenta alguno de los siguientes síntomas christian más de 2 semanas después de giraldo lesión:   · Dolor de kevyn que perdura (crónico).  · Mareos o problemas con el equilibrio.  · Náuseas.  · Problemas de visión.  · Aumento de la sensibilidad a los ruidos o la aleida.  · Depresión y cambios en el estado de ánimo.  · Ansiedad o irritabilidad.  · Problemas de memoria.  · Dificultad para concentrarse o mantener la atención.  · Problemas para dormir.  · Cansancio permanente.  SOLICITE ATENCIÓN MÉDICA DE INMEDIATO SI:   · Los shima de kevyn son intensos o empeoran. Lantana puede ser un signo de que hay un coágulo sanguíneo en la kevyn.  · Siente debilidad (aun si es solo en calin mano, calin pierna o parte del  pearl).  · Siente entumecimiento.  · Le falta coordinación.  · Vomita repetidas veces.  · Está más somnoliento.  · Calin pupila está más carolann que la otra.  · Tiene convulsiones.  · Tiene dificultad para hablar.  · Siente calin confusión que va en aumento. [place] puede ser un signo de que hay un coágulo sanguíneo en el cerebro.  · Aumenta la intranquilidad, la agitación o la irritabilidad.  · No puede reconocer personas o lugares.  · Siente dolor en el petrona.  · Le resulta difícil despertarse.  · Tiene cambios de conducta inusuales.  · Pierde la conciencia.  ASEGÚRESE DE QUE:   · Comprende estas instrucciones.  · Controlará giraldo afección.  · Recibirá ayuda de inmediato si no mejora o si empeora.     Esta información no tiene dorothy fin reemplazar el consejo del médico. Asegúrese de hacerle al médico cualquier pregunta que tenga.     Document Released: 11/30/2009 Document Revised: 01/08/2016  [employer] Interactive Patient Education ©2016 [employer]    Cuidado de un desgarro en los adultos  (Laceration Care, Adult)  Un desgarro es un halley que atraviesa todas las capas de piel. El halley también llega al tejido que está debajo de la piel. Algunos carrera cicatrizan por sí solos. Otros se deben cerrar con puntos (suturas), grapas, tiras adhesivas para la piel o adhesivo para heridas. El cuidado del halley reduce el riesgo de infección y ayuda a calin mejor cicatrización.  CÓMO CUIDAR DEL HALLEY  Para los puntos o las grapas, maria g lo siguiente:  · Mantenga la herida limpia y seca.  · Si le colocaron calin venda (vendaje), debe cambiarla al menos calin vez al día o dorothy se lo haya indicado el médico. También debe cambiarla si se moja o se ensucia.  · Mantenga la herida completamente seca christian las primeras 24 horas o dorothy se lo haya indicado el médico. Transcurrido christopher tiempo, puede ducharse o keith un baño de inmersión. No obstante, asegúrese de no sumergir la herida en agua hasta que le hayan quitado los puntos o las  grapas.  · Limpie la herida calin vez al día o dorothy se lo haya indicado el médico:  ¨ Lave la herida con agua y jabón.  ¨ Enjuague la herida con agua hasta sacar todo el jabón.  ¨ Seque dando palmaditas con calin toalla limpia. No frote la herida.  · Después de limpiar la herida, aplique calin capa delgada de ungüento con antibiótico dorothy se lo haya indicado el médico. El ungüento se aplica con estos fines:  ¨ Ayuda a prevenir calin infección.  ¨ Heather que la venda se adhiera a la herida.  · Concurra para que le retiren los puntos cuando el médico le indique.  Si el médico utilizó tiras adhesivas:   · Mantenga la herida limpia y seca.  · Si le colocaron calin venda, debe cambiarla al menos calin vez al día o dorothy se lo haya indicado el médico. También debe cambiarla si se ensucia o se moja.  · No deje que las tiras adhesivas se mojen. Puede bañarse o ducharse, willa tenga cuidado de no mojar la herida.  · Si se moja, séquela dando palmaditas con calin toalla limpia. No frote la herida.  · Las tiras adhesivas se caen solas. Puede recortar las tiras a medida que la herida cicatriza. No quite las tiras que aún están pegadas a la herida. Se caerán después de un tiempo.  Si el médico utilizó adhesivo para heridas:  · Trate de mantener la herida seca; sin embargo, puede mojarla ligeramente cuando se bañe o se duche. No sumerja la herida en el agua, por ejemplo, al nadar.  · Después de ducharse o bañarse, seque la herida con cuidado dando palmaditas con calin toalla limpia. No frote la herida.  · No practique actividades que lo raquel transpirar mucho hasta que el adhesivo se haya salido solo.  · No aplique líquidos, cremas ni ungüentos medicinales en la herida mientras esté el adhesivo.  · Si le colocaron calin venda, debe cambiarla al menos calin vez al día o dorothy se lo haya indicado el médico. También debe cambiarla si se ensucia o se moja.  · Si le colocan calin venda sobre la herida, no deje que la cinta de la venda toque el adhesivo.  · No  toque el adhesivo. El adhesivo suele permanecer en la piel de 5 a 10 días. Luego, se sale solo.  Instrucciones generales   · Para ayudar a evitar la formación de cicatrices, cúbrase la herida con pantalla solar siempre que esté al aire hedy después de que le hayan retirado los puntos o las tiras adhesivas o cuando todavía tenga el adhesivo en la piel y la herida haya cicatrizado. Use calin pantalla solar con factor de protección solar (FPS) de por lo menos 30.  · Dean los medicamentos de venta hedy y los recetados solamente dorothy se lo haya indicado el médico.  · Si le indicaron un ungüento o un medicamento con antibiótico, aplíquelo o tómelo dorothy se lo haya dicho el médico. No deje de usar el antibiótico aunque la herida esté mejorando.  · No se rasque ni se toque la herida.  · Concurra a todas las visitas de control dorothy se lo haya indicado el médico. [place] es importante.  · Controle la herida todos los días para detectar signos de infección. Esté atento a lo siguiente:  ¨ Dolor, hinchazón o enrojecimiento.  ¨ Líquido, jaxon o pus.  · Cuando esté sentado o acostado, eleve la cinthia de la lesión por encima del nivel del corazón, si es posible.  SOLICITE AYUDA SI:  · Recibió calin vacuna antitetánica y tiene cualquiera de estos problemas en el sitio de la inyección:  ¨ Hinchazón.  ¨ Dolor intenso.  ¨ Enrojecimiento.  ¨ Hemorragia.  · Tiene fiebre.  · La herida estaba cerrada y se abre.  · Percibe que sale mal olor de la herida o de la venda.  · Nota un cuerpo extraño en la herida, dorothy un trozo de peña o chiquita.  · Los medicamentos no le alivian el dolor.  · Tiene más enrojecimiento, hinchazón o dolor en el lugar de la herida.  · Observa líquido, jaxon o pus que salen de la herida.  · Observa que la piel cerca de la herida cambia de color.  · Debe cambiar la venda con frecuencia debido a que hay secreción de líquido, jaxon o pus de la herida.  · Tiene calin erupción cutánea nueva.  · Comienza a tener entumecimiento  alrededor de la herida.  SOLICITE AYUDA DE INMEDIATO SI:  · Hay mucha hinchazón alrededor de la herida.  · El dolor empeora repentinamente y es muy intenso.  · Tiene bultos dolorosos cerca de la herida o en la piel de cualquier parte del cuerpo.  · Tiene calin línea carmen que sale de la herida.  · La herida está en la mano o en el pie y no puede  cherelle los dedos con normalidad.  · La herida está en la mano o en el pie y observa que los dedos tienen un luis pálido o azulado.     Esta información no tiene dorothy fin reemplazar el consejo del médico. Asegúrese de hacerle al médico cualquier pregunta que tenga.     Document Released: 08/15/2012 Document Revised: 05/03/2016  [employer] Interactive Patient Education ©2016 [employer]

## 2017-08-16 NOTE — ED NOTES
Chief Complaint   Patient presents with   • Fall Less than 10 Feet     possible LOC   • Laceration     L shin, head       Pt climbing off roof and ladder slipped.  Possible loc

## 2017-08-16 NOTE — ED PROVIDER NOTES
ED Provider Note    Scribed for Renetta Wasserman M.D. by Omkar Pimentel. 8/15/2017, 7:18 PM.    Primary care provider: Michael Rowe M.D.  Means of arrival: Walk-in  History obtained from: Patient  History limited by: None    CHIEF COMPLAINT  Chief Complaint   Patient presents with   • Fall Less than 10 Feet     possible LOC   • Laceration     L shin, head       HPI  Odilon Gan is a 57 y.o. male who presents to the Emergency Department for evaluation of fall less than 10 feet onset prior to arrival. The patient was climbing off of his roof onto the ladder when the ladder slipped and the patient fell less than 10 feet onto the ground. He sustained loss of consciousness for an unknown amount of time. He also developed lacerations and abrasions on his head, bilateral upper and lower extremities, but denies any chest pain, abdominal pain, or numbness or tingling. His tetanus shot is not up to date.    REVIEW OF SYSTEMS  CARDIAC: no chest pain  GI: no abdominal pain  Neuro: Fall, loss of consciousness. No focal numbness or tingling.   SKIN: Abrasion and contusion to head, bilateral upper and lower extremities.     See history of present illness. All other systems are negative. C.     PAST MEDICAL HISTORY   has a past medical history of Hypertension and Diabetes.    SURGICAL HISTORY  patient denies any surgical history    SOCIAL HISTORY  Social History   Substance Use Topics   • Smoking status: Never Smoker    • Alcohol Use: Yes      Comment: occ      History   Drug Use No     FAMILY HISTORY  No family history noted     CURRENT MEDICATIONS  No current facility-administered medications for this encounter.    Current outpatient prescriptions:   •  hydrocodone-acetaminophen (NORCO) 5-325 MG Tab per tablet, Take 1 Tab by mouth every four hours as needed., Disp: 20 Tab, Rfl: 0  •  ondansetron (ZOFRAN ODT) 4 MG TABLET DISPERSIBLE, Take 1 Tab by mouth every 8 hours as needed for Nausea/Vomiting., Disp: 10 Tab, Rfl: 0  •   "simvastatin (ZOCOR) 80 MG tablet, Take 80 mg by mouth every evening., Disp: , Rfl:   •  lisinopril (PRINIVIL) 20 MG TABS, Take 20 mg by mouth every day., Disp: , Rfl:   •  promethazine (PHENERGAN) 25 MG TABS, Take 1 Tab by mouth every 6 hours as needed for Nausea/Vomiting., Disp: 20 Each, Rfl: 0  •  lorazepam (ATIVAN) 1 MG TABS, Take 1 Tab by mouth every four hours as needed (vertigo)., Disp: 20 Each, Rfl: 0  •  Insulin Zinc Human (NOVOLIN L SC), Inject  as instructed., Disp: , Rfl:   •  aspirin effervescent (EVGENY-SELTZER) 325 MG TBEF, Take 1 Tab by mouth every 6 hours as needed., Disp: , Rfl:   •  insulin detemir (LEVEMIR) 100 UNIT/ML SOLN, Inject 10 Units as instructed every evening., Disp: , Rfl:   •  meclizine (ANTIVERT) 25 MG TABS, Take 1 Tab by mouth 3 times a day as needed for Dizziness, Nausea/Vomiting and Vertigo., Disp: 30 Each, Rfl: 0     ALLERGIES  No Known Allergies    PHYSICAL EXAM  VITAL SIGNS: /69 mmHg  Pulse 76  Temp(Src) 36.4 °C (97.6 °F)  Resp 16  Ht 1.702 m (5' 7.01\")  Wt 79.2 kg (174 lb 9.7 oz)  BMI 27.34 kg/m2  SpO2 98%    Constitutional: GCS 15, ABC's intact  HENT: 2cm laceration to right forehead above eyebrow, abrasion to occipital area of scalp.   Eyes: PERRLA, EOMI, Conjunctiva normal, No discharge.   Cardiovascular: Normal heart rate, Normal rhythm, No murmurs, No rubs, No gallops.   Thorax & Lungs: Normal breath sounds, No respiratory distress, No wheezing, No chest tenderness. No subcutaneous emphysema or paradoxical chest wall movements  Abdomen: Bowel sounds normal, Soft, No tenderness, No masses, No pulsatile masses, no abdominal wall contusions  Skin: Warm, Dry.   Back: No C,T, or L spine tenderness.    Musculoskeletal: Contusion to right shoulder, abrasion to left forearm. Left anterior shin with 2.5cm deep laceration. normal range of mtoion, distal neurovascularly intact   Neurologic: Alert & oriented x 3, Normal sensory function, No focal deficits noted. "     RADIOLOGY  DX-TIBIA AND FIBULA LEFT   Final Result         No acute fracture.      There is chronic fragmentation of the tibial tubercle with overlying soft tissue swelling, likely related to Osgood-Schlatter disease. Superimposed injury is possible.      DX-SHOULDER 2+ RIGHT   Final Result         1. No acute osseous abnormality.      CT-HEAD W/O   Final Result      1.  No evidence of acute intracranial process.      2.  Small right supraorbital cephalohematoma.      The radiologist's interpretation of all radiological studies have been reviewed by me.    Laceration Repair Procedure Note    Indication: Lacerations    Procedure: The patient was placed in the appropriate position and anesthesia around the lacerations were obtained by infiltration using 1% Lidocaine without epinephrine. The area was then cleansed with betadine and draped in a sterile fashion and irrigated with normal saline. The laceration was closed with 4-0 Ethilon using interrupted sutures. A second laceration was closed with Dermabond. The wound area was then dressed with a sterile dressing.      Total repaired wound length: 5 cm.     Other Items: Suture count: 5    The patient tolerated the procedure well.    Complications: None    COURSE & MEDICAL DECISION MAKING  Pertinent Labs & Imaging studies reviewed. (See chart for details)    7:18 PM - Patient seen and examined in the trauma bay. Patient will be treated with Norco 5-325mg PO. His tetanus shot will be up to date. I discussed laceration repair with the patient, which he understands and agrees with. Ordered DX-tibia and fibula left, CT-head, and DX-shoulder right to evaluate his symptoms.     8:00PM  Performed laceration repair procedure, see above note for more details.     I reviewed prescription monitoring program for patient's narcotic use before prescribing a scheduled drug.The patient will not drink alcohol nor drive with prescribed medications.The patient will return for new or  worsening symptoms and is stable at the time of discharge.    DISPOSITION:  Patient will be discharged home in stable condition.    FOLLOW UP:  Michael Rowe M.D.  [address]  [place] [id number] [phone]    Call in 2 days  for recheck      OUTPATIENT MEDICATIONS:  Discharge Medication List as of 8/15/2017  8:40 PM      START taking these medications    Details   hydrocodone-acetaminophen (NORCO) 5-325 MG Tab per tablet Take 1 Tab by mouth every four hours as needed., Disp-20 Tab, R-0, Print Rx Paper      ondansetron (ZOFRAN ODT) 4 MG TABLET DISPERSIBLE Take 1 Tab by mouth every 8 hours as needed for Nausea/Vomiting., Disp-10 Tab, R-0, Print Rx Paper           FINAL IMPRESSION  1. Fall, initial encounter    2. CHI (closed head injury), initial encounter    3. Laceration of scalp, initial encounter    4. Laceration of knee, left, initial encounter    5. Abrasion of arm, left, initial encounter          IOmkar (Scribe), am scribing for, and in the presence of, Renetta Wasserman M.D..    Electronically signed by: Omkar Pimentel (Scribe), 8/15/2017    IRenetta M.D. personally performed the services described in this documentation, as scribed by Omkar Pimentel in my presence, and it is both accurate and complete.    The note accurately reflects work and decisions made by me.  Renetta Wasserman  8/15/2017  9:05 PM

## 2017-09-06 ENCOUNTER — EH NON-PROVIDER (OUTPATIENT)
Dept: OCCUPATIONAL MEDICINE | Facility: CLINIC | Age: 57
End: 2017-09-06

## 2017-09-06 DIAGNOSIS — Z29.89 NEED FOR ISOLATION: ICD-10-CM

## 2017-10-03 ENCOUNTER — IMMUNIZATION (OUTPATIENT)
Dept: OCCUPATIONAL MEDICINE | Facility: CLINIC | Age: 57
End: 2017-10-03

## 2017-10-03 DIAGNOSIS — Z23 NEED FOR VACCINATION: ICD-10-CM

## 2017-10-03 PROCEDURE — 90686 IIV4 VACC NO PRSV 0.5 ML IM: CPT | Performed by: PREVENTIVE MEDICINE

## 2018-08-17 ENCOUNTER — DOCUMENTATION (OUTPATIENT)
Dept: OCCUPATIONAL MEDICINE | Facility: CLINIC | Age: 58
End: 2018-08-17

## 2018-09-19 ENCOUNTER — IMMUNIZATION (OUTPATIENT)
Dept: OCCUPATIONAL MEDICINE | Facility: CLINIC | Age: 58
End: 2018-09-19

## 2018-09-19 DIAGNOSIS — Z23 NEED FOR VACCINATION: ICD-10-CM

## 2018-09-22 PROCEDURE — 90686 IIV4 VACC NO PRSV 0.5 ML IM: CPT | Performed by: PREVENTIVE MEDICINE

## 2018-09-26 ENCOUNTER — HOSPITAL ENCOUNTER (OUTPATIENT)
Dept: LAB | Facility: MEDICAL CENTER | Age: 58
End: 2018-09-26
Payer: COMMERCIAL

## 2018-09-26 LAB
BDY FAT % MEASURED: 26.2 %
BP DIAS: 85 MMHG
BP SYS: 170 MMHG
CHOLEST SERPL-MCNC: 157 MG/DL (ref 100–199)
DIABETES HTDIA: YES
EST. AVERAGE GLUCOSE BLD GHB EST-MCNC: 214 MG/DL
EVENT NAME HTEVT: NORMAL
FASTING HTFAS: YES
GLUCOSE SERPL-MCNC: 152 MG/DL (ref 65–99)
HBA1C MFR BLD: 9.1 % (ref 0–5.6)
HDLC SERPL-MCNC: 53 MG/DL
HYPERTENSION HTHYP: YES
LDLC SERPL CALC-MCNC: 80 MG/DL
SCREENING LOC CITY HTCIT: NORMAL
SCREENING LOC STATE HTSTA: NORMAL
SCREENING LOCATION HTLOC: NORMAL
SMOKING HTSMO: NO
SUBSCRIBER ID HTSID: NORMAL
TRIGL SERPL-MCNC: 119 MG/DL (ref 0–149)

## 2018-09-26 PROCEDURE — 36415 COLL VENOUS BLD VENIPUNCTURE: CPT

## 2018-09-26 PROCEDURE — 80061 LIPID PANEL: CPT

## 2018-09-26 PROCEDURE — 83036 HEMOGLOBIN GLYCOSYLATED A1C: CPT

## 2018-09-26 PROCEDURE — 82947 ASSAY GLUCOSE BLOOD QUANT: CPT

## 2018-09-26 PROCEDURE — S5190 WELLNESS ASSESSMENT BY NONPH: HCPCS

## 2018-10-24 ENCOUNTER — HOSPITAL ENCOUNTER (OUTPATIENT)
Dept: LAB | Facility: MEDICAL CENTER | Age: 58
End: 2018-10-24
Attending: PHYSICIAN ASSISTANT
Payer: COMMERCIAL

## 2019-06-05 ENCOUNTER — HOSPITAL ENCOUNTER (OUTPATIENT)
Dept: LAB | Facility: MEDICAL CENTER | Age: 59
End: 2019-06-05
Attending: PHYSICIAN ASSISTANT
Payer: COMMERCIAL

## 2019-06-05 LAB
ALBUMIN SERPL BCP-MCNC: 3.6 G/DL (ref 3.2–4.9)
ALBUMIN/GLOB SERPL: 1.2 G/DL
ALP SERPL-CCNC: 53 U/L (ref 30–99)
ALT SERPL-CCNC: 16 U/L (ref 2–50)
ANION GAP SERPL CALC-SCNC: 10 MMOL/L (ref 0–11.9)
AST SERPL-CCNC: 24 U/L (ref 12–45)
BILIRUB SERPL-MCNC: 0.2 MG/DL (ref 0.1–1.5)
BUN SERPL-MCNC: 32 MG/DL (ref 8–22)
CALCIUM SERPL-MCNC: 9.1 MG/DL (ref 8.5–10.5)
CHLORIDE SERPL-SCNC: 103 MMOL/L (ref 96–112)
CHOLEST SERPL-MCNC: 116 MG/DL (ref 100–199)
CO2 SERPL-SCNC: 22 MMOL/L (ref 20–33)
CREAT SERPL-MCNC: 1.01 MG/DL (ref 0.5–1.4)
GLOBULIN SER CALC-MCNC: 3 G/DL (ref 1.9–3.5)
GLUCOSE SERPL-MCNC: 146 MG/DL (ref 65–99)
HDLC SERPL-MCNC: 47 MG/DL
LDLC SERPL CALC-MCNC: 56 MG/DL
POTASSIUM SERPL-SCNC: 4.6 MMOL/L (ref 3.6–5.5)
PROT SERPL-MCNC: 6.6 G/DL (ref 6–8.2)
SODIUM SERPL-SCNC: 135 MMOL/L (ref 135–145)
TRIGL SERPL-MCNC: 63 MG/DL (ref 0–149)

## 2019-06-05 PROCEDURE — 80053 COMPREHEN METABOLIC PANEL: CPT

## 2019-06-05 PROCEDURE — 36415 COLL VENOUS BLD VENIPUNCTURE: CPT

## 2019-06-05 PROCEDURE — 80061 LIPID PANEL: CPT

## 2019-07-23 ENCOUNTER — HOSPITAL ENCOUNTER (OUTPATIENT)
Dept: LAB | Facility: MEDICAL CENTER | Age: 59
End: 2019-07-23
Payer: COMMERCIAL

## 2019-07-23 LAB
BDY FAT % MEASURED: 19.8 %
BP DIAS: 84 MMHG
BP SYS: 154 MMHG
CHOLEST SERPL-MCNC: 148 MG/DL (ref 100–199)
DIABETES HTDIA: YES
EST. AVERAGE GLUCOSE BLD GHB EST-MCNC: 169 MG/DL
EVENT NAME HTEVT: NORMAL
FASTING HTFAS: YES
GLUCOSE SERPL-MCNC: 141 MG/DL (ref 65–99)
HBA1C MFR BLD: 7.5 % (ref 0–5.6)
HDLC SERPL-MCNC: 54 MG/DL
HYPERTENSION HTHYP: NO
LDLC SERPL CALC-MCNC: 79 MG/DL
SCREENING LOC CITY HTCIT: NORMAL
SCREENING LOC STATE HTSTA: NORMAL
SCREENING LOCATION HTLOC: NORMAL
SMOKING HTSMO: NO
SUBSCRIBER ID HTSID: NORMAL
TRIGL SERPL-MCNC: 76 MG/DL (ref 0–149)

## 2019-07-23 PROCEDURE — 83036 HEMOGLOBIN GLYCOSYLATED A1C: CPT

## 2019-07-23 PROCEDURE — 80061 LIPID PANEL: CPT

## 2019-07-23 PROCEDURE — 82947 ASSAY GLUCOSE BLOOD QUANT: CPT

## 2019-07-23 PROCEDURE — 36415 COLL VENOUS BLD VENIPUNCTURE: CPT

## 2019-07-23 PROCEDURE — S5190 WELLNESS ASSESSMENT BY NONPH: HCPCS

## 2019-09-25 ENCOUNTER — IMMUNIZATION (OUTPATIENT)
Dept: OCCUPATIONAL MEDICINE | Facility: CLINIC | Age: 59
End: 2019-09-25

## 2019-09-25 DIAGNOSIS — Z23 NEED FOR VACCINATION: ICD-10-CM

## 2019-09-25 PROCEDURE — 90686 IIV4 VACC NO PRSV 0.5 ML IM: CPT | Performed by: PREVENTIVE MEDICINE

## 2020-08-17 ENCOUNTER — HOSPITAL ENCOUNTER (OUTPATIENT)
Dept: LAB | Facility: MEDICAL CENTER | Age: 60
End: 2020-08-17
Attending: PHYSICIAN ASSISTANT
Payer: COMMERCIAL

## 2020-08-17 ENCOUNTER — HOSPITAL ENCOUNTER (OUTPATIENT)
Dept: LAB | Facility: MEDICAL CENTER | Age: 60
End: 2020-08-17
Payer: COMMERCIAL

## 2020-08-17 LAB
ALBUMIN SERPL BCP-MCNC: 3.9 G/DL (ref 3.2–4.9)
ALBUMIN/GLOB SERPL: 1.3 G/DL
ALP SERPL-CCNC: 66 U/L (ref 30–99)
ALT SERPL-CCNC: 18 U/L (ref 2–50)
ANION GAP SERPL CALC-SCNC: 9 MMOL/L (ref 7–16)
AST SERPL-CCNC: 15 U/L (ref 12–45)
BDY FAT % MEASURED: 17.2 %
BILIRUB SERPL-MCNC: 0.3 MG/DL (ref 0.1–1.5)
BP DIAS: 73 MMHG
BP SYS: 138 MMHG
BUN SERPL-MCNC: 30 MG/DL (ref 8–22)
CALCIUM SERPL-MCNC: 9.2 MG/DL (ref 8.5–10.5)
CHLORIDE SERPL-SCNC: 104 MMOL/L (ref 96–112)
CHOLEST SERPL-MCNC: 139 MG/DL (ref 100–199)
CHOLEST SERPL-MCNC: 141 MG/DL (ref 100–199)
CO2 SERPL-SCNC: 24 MMOL/L (ref 20–33)
CREAT SERPL-MCNC: 0.95 MG/DL (ref 0.5–1.4)
DIABETES HTDIA: YES
EST. AVERAGE GLUCOSE BLD GHB EST-MCNC: 203 MG/DL
EVENT NAME HTEVT: NORMAL
FASTING HTFAS: YES
FASTING STATUS PATIENT QL REPORTED: NORMAL
GLOBULIN SER CALC-MCNC: 2.9 G/DL (ref 1.9–3.5)
GLUCOSE SERPL-MCNC: 105 MG/DL (ref 65–99)
GLUCOSE SERPL-MCNC: 107 MG/DL (ref 65–99)
HBA1C MFR BLD: 8.7 % (ref 0–5.6)
HDLC SERPL-MCNC: 49 MG/DL
HDLC SERPL-MCNC: 49 MG/DL
HYPERTENSION HTHYP: NO
LDLC SERPL CALC-MCNC: 72 MG/DL
LDLC SERPL CALC-MCNC: 74 MG/DL
POTASSIUM SERPL-SCNC: 4.7 MMOL/L (ref 3.6–5.5)
PROT SERPL-MCNC: 6.8 G/DL (ref 6–8.2)
SCREENING LOC CITY HTCIT: NORMAL
SCREENING LOC STATE HTSTA: NORMAL
SCREENING LOCATION HTLOC: NORMAL
SMOKING HTSMO: NO
SODIUM SERPL-SCNC: 137 MMOL/L (ref 135–145)
SUBSCRIBER ID HTSID: NORMAL
TRIGL SERPL-MCNC: 91 MG/DL (ref 0–149)
TRIGL SERPL-MCNC: 92 MG/DL (ref 0–149)
TSH SERPL DL<=0.005 MIU/L-ACNC: 1.98 UIU/ML (ref 0.38–5.33)

## 2020-08-17 PROCEDURE — 80053 COMPREHEN METABOLIC PANEL: CPT

## 2020-08-17 PROCEDURE — 80061 LIPID PANEL: CPT | Mod: 91

## 2020-08-17 PROCEDURE — 36415 COLL VENOUS BLD VENIPUNCTURE: CPT

## 2020-08-17 PROCEDURE — 82947 ASSAY GLUCOSE BLOOD QUANT: CPT

## 2020-08-17 PROCEDURE — 83036 HEMOGLOBIN GLYCOSYLATED A1C: CPT

## 2020-08-17 PROCEDURE — S5190 WELLNESS ASSESSMENT BY NONPH: HCPCS

## 2020-08-17 PROCEDURE — 80061 LIPID PANEL: CPT

## 2020-08-17 PROCEDURE — 84443 ASSAY THYROID STIM HORMONE: CPT

## 2020-09-25 ENCOUNTER — IMMUNIZATION (OUTPATIENT)
Dept: OCCUPATIONAL MEDICINE | Facility: CLINIC | Age: 60
End: 2020-09-25

## 2020-09-25 DIAGNOSIS — Z23 NEED FOR VACCINATION: ICD-10-CM

## 2020-09-25 PROCEDURE — 90686 IIV4 VACC NO PRSV 0.5 ML IM: CPT | Performed by: NURSE PRACTITIONER

## 2020-12-17 DIAGNOSIS — Z23 NEED FOR VACCINATION: ICD-10-CM

## 2020-12-29 ENCOUNTER — IMMUNIZATION (OUTPATIENT)
Dept: FAMILY PLANNING/WOMEN'S HEALTH CLINIC | Facility: IMMUNIZATION CENTER | Age: 60
End: 2020-12-29
Attending: FAMILY MEDICINE
Payer: COMMERCIAL

## 2020-12-29 DIAGNOSIS — Z23 ENCOUNTER FOR VACCINATION: Primary | ICD-10-CM

## 2020-12-29 DIAGNOSIS — Z23 NEED FOR VACCINATION: ICD-10-CM

## 2020-12-29 PROCEDURE — 91301 MODERNA SARS-COV-2 VACCINE: CPT

## 2020-12-29 PROCEDURE — 0011A MODERNA SARS-COV-2 VACCINE: CPT

## 2021-01-29 PROCEDURE — 91301 MODERNA SARS-COV-2 VACCINE: CPT

## 2021-01-29 PROCEDURE — 0012A MODERNA SARS-COV-2 VACCINE: CPT

## 2021-01-31 ENCOUNTER — IMMUNIZATION (OUTPATIENT)
Dept: FAMILY PLANNING/WOMEN'S HEALTH CLINIC | Facility: IMMUNIZATION CENTER | Age: 61
End: 2021-01-31
Payer: COMMERCIAL

## 2021-01-31 DIAGNOSIS — Z23 ENCOUNTER FOR VACCINATION: Primary | ICD-10-CM

## 2021-08-18 ENCOUNTER — HOSPITAL ENCOUNTER (OUTPATIENT)
Dept: LAB | Facility: MEDICAL CENTER | Age: 61
End: 2021-08-18
Attending: PHYSICIAN ASSISTANT
Payer: COMMERCIAL

## 2021-08-18 LAB
ALBUMIN SERPL BCP-MCNC: 3.7 G/DL (ref 3.2–4.9)
ALBUMIN/GLOB SERPL: 1.1 G/DL
ALP SERPL-CCNC: 81 U/L (ref 30–99)
ALT SERPL-CCNC: 18 U/L (ref 2–50)
ANION GAP SERPL CALC-SCNC: 9 MMOL/L (ref 7–16)
AST SERPL-CCNC: 23 U/L (ref 12–45)
BILIRUB SERPL-MCNC: 0.3 MG/DL (ref 0.1–1.5)
BUN SERPL-MCNC: 28 MG/DL (ref 8–22)
CALCIUM SERPL-MCNC: 9.3 MG/DL (ref 8.5–10.5)
CHLORIDE SERPL-SCNC: 100 MMOL/L (ref 96–112)
CHOLEST SERPL-MCNC: 141 MG/DL (ref 100–199)
CO2 SERPL-SCNC: 26 MMOL/L (ref 20–33)
CREAT SERPL-MCNC: 1.05 MG/DL (ref 0.5–1.4)
GLOBULIN SER CALC-MCNC: 3.4 G/DL (ref 1.9–3.5)
GLUCOSE SERPL-MCNC: 127 MG/DL (ref 65–99)
HDLC SERPL-MCNC: 48 MG/DL
LDLC SERPL CALC-MCNC: 74 MG/DL
POTASSIUM SERPL-SCNC: 4.8 MMOL/L (ref 3.6–5.5)
PROT SERPL-MCNC: 7.1 G/DL (ref 6–8.2)
SODIUM SERPL-SCNC: 135 MMOL/L (ref 135–145)
TRIGL SERPL-MCNC: 94 MG/DL (ref 0–149)
TSH SERPL DL<=0.005 MIU/L-ACNC: 3.27 UIU/ML (ref 0.38–5.33)

## 2021-08-18 PROCEDURE — 80053 COMPREHEN METABOLIC PANEL: CPT

## 2021-08-18 PROCEDURE — 80061 LIPID PANEL: CPT

## 2021-08-18 PROCEDURE — 36415 COLL VENOUS BLD VENIPUNCTURE: CPT

## 2021-08-18 PROCEDURE — 84443 ASSAY THYROID STIM HORMONE: CPT

## 2021-09-09 ENCOUNTER — OFFICE VISIT (OUTPATIENT)
Dept: URGENT CARE | Facility: PHYSICIAN GROUP | Age: 61
End: 2021-09-09
Payer: COMMERCIAL

## 2021-09-09 VITALS
SYSTOLIC BLOOD PRESSURE: 140 MMHG | OXYGEN SATURATION: 97 % | DIASTOLIC BLOOD PRESSURE: 78 MMHG | TEMPERATURE: 97.2 F | WEIGHT: 170 LBS | RESPIRATION RATE: 18 BRPM | HEART RATE: 78 BPM | BODY MASS INDEX: 24.34 KG/M2 | HEIGHT: 70 IN

## 2021-09-09 DIAGNOSIS — H81.10 BENIGN PAROXYSMAL POSITIONAL VERTIGO, UNSPECIFIED LATERALITY: ICD-10-CM

## 2021-09-09 PROCEDURE — 99203 OFFICE O/P NEW LOW 30 MIN: CPT | Performed by: NURSE PRACTITIONER

## 2021-09-09 RX ORDER — ATORVASTATIN CALCIUM 40 MG/1
TABLET, FILM COATED ORAL
COMMUNITY
Start: 2021-08-04

## 2021-09-09 RX ORDER — HYDROCHLOROTHIAZIDE 25 MG/1
TABLET ORAL
COMMUNITY
Start: 2021-06-16

## 2021-09-09 RX ORDER — VERAPAMIL HYDROCHLORIDE 80 MG/1
TABLET ORAL
COMMUNITY
Start: 2021-08-01

## 2021-09-09 RX ORDER — BENAZEPRIL HYDROCHLORIDE 40 MG/1
TABLET ORAL
COMMUNITY
Start: 2021-06-17

## 2021-09-09 RX ORDER — MECLIZINE HYDROCHLORIDE 25 MG/1
25 TABLET ORAL 3 TIMES DAILY PRN
Qty: 30 TABLET | Refills: 0 | Status: SHIPPED | OUTPATIENT
Start: 2021-09-09

## 2021-09-09 ASSESSMENT — ENCOUNTER SYMPTOMS
ABDOMINAL PAIN: 0
SENSORY CHANGE: 0
DIZZINESS: 1
COUGH: 0
VOMITING: 0
NECK PAIN: 0
NAUSEA: 0
HEADACHES: 0
FEVER: 0
DIARRHEA: 0
CHILLS: 0

## 2021-09-09 ASSESSMENT — LIFESTYLE VARIABLES: SUBSTANCE_ABUSE: 0

## 2021-09-09 NOTE — PATIENT INSTRUCTIONS
Mareos  Dizziness  Los mareos son un problema muy frecuente. Se trata de calin sensación de inestabilidad o desvanecimiento. Puede sentir que se va a desmayar. Los mareos pueden provocarle calin lesión si se tropieza o se . Las personas de todas las edades pueden sufrir mareos, willa es más frecuente en los adultos mayores. Esta afección puede tener muchas causas, entre las que se pueden mencionar los medicamentos, la deshidratación y las enfermedades.  Siga estas indicaciones en giraldo casa:  Comida y bebida  · Xiomara suficiente líquido dorothy para mantener la orina taty o de color amarillo pálido. [place] elise la deshidratación. Trate de beber más líquidos transparentes, dorothy agua.  · No xiomara alcohol.  · Limite el consumo de cafeína si el médico se lo indica. Verifique los ingredientes y la información nutricional para saber si un alimento o calin bebida contienen cafeína.  · Limite el consumo de sal (sodio) si el médico se lo indica. Verifique los ingredientes y la información nutricional para saber si un alimento o calin bebida contienen sodio.  Actividad  · Evite los movimientos rápidos.  ? Levántese de las kory con lentitud y apóyese hasta sentirse yamil.  ? Por la mañana, siéntese sujey a un lado de la cama. Cuando se sienta yamil, póngase lentamente de pie mientras se sostiene de algo, hasta que sepa que ha logrado el equilibrio.  · Mueva las piernas con frecuencia si debe estar de pie en un lugar christian mucho tiempo. Mientras esté de pie, contraiga y relaje los músculos de las piernas.  · No conduzca vehículos ni opere maquinaria pesada si se siente mareado.  · Evite agacharse si se siente mareado. En giraldo casa, coloque los objetos de modo que le resulte fácil alcanzarlos sin agacharse.  Estilo de kae  · No consuma ningún producto que contenga nicotina o tabaco, dorothy cigarrillos y cigarrillos electrónicos. Si necesita ayuda para dejar de fumar, consulte al médico.  · Trate de reducir el nivel de estrés con métodos dorothy  el yoga o la meditación. Hable con el médico si necesita ayuda para controlar el nivel de estrés.  Instrucciones generales  · Controle noelle mareos para yonathan si hay cambios.  · [place] los medicamentos de venta hedy y los recetados solamente dorothy se lo haya indicado el médico. Hable con el médico si rebecca que los medicamentos que está tomando son la causa de noelle mareos.  · Infórmele a un amigo o a un familiar si se siente mareado. [place] a esta persona que llame al médico si observa cambios en giraldo comportamiento.  · Concurra a todas las visitas de seguimiento dorothy se lo haya indicado el médico. [place] es importante.  Comuníquese con un médico si:  · Los mareos persisten.  · Los mareos o la sensación de desvanecimiento empeoran.  · Siente náuseas.  · Se le redujo la audición.  · Aparecen nuevos síntomas.  · Cuando está de pie, se siente inestable o que la habitación da vueltas.  Solicite ayuda de inmediato si:  · Vomita o tiene diarrea y no puede comer ni beber nada.  · Tiene dificultad para hablar, caminar, tragar o usar los brazos, las tanya o las piernas.  · Se siente usualmente débil.  · No piensa con claridad o tiene dificultad para armar oraciones. Es posible que un amigo o un familiar adviertan que esto ocurre.  · Tiene dolor de pecho, dolor abdominal, sudoración o le falta el aire.  · Sufre cambios en la visión.  · Tiene cualquier tipo de sangrado.  · Tiene dolor de kevyn intenso.  · Tiene dolor o rigidez en el petrona.  · Tiene fiebre.  Estos síntomas pueden representar un problema grave que constituye calin emergencia. No espere hasta que los síntomas desaparezcan. Solicite atención médica de inmediato. Comuníquese con el servicio de emergencias de giraldo localidad (911 en los [place]). No conduzca por noelle propios medios hasta el hospital.  Resumen  · Los mareos son calin sensación de inestabilidad o desvanecimiento. Esta afección puede tener muchas causas, entre las que se pueden mencionar los medicamentos, la  deshidratación y las enfermedades.  · Las personas de todas las edades pueden sufrir mareos, willa es más frecuente en los adultos mayores.  · [place] suficiente líquido dorothy para mantener la orina taty o de color amarillo pálido. No xiomara alcohol.  · Evite los movimientos rápidos si se siente mareado. Controle noelle mareos para yonathan si hay cambios.  Esta información no tiene dorothy fin reemplazar el consejo del médico. Asegúrese de hacerle al médico cualquier pregunta que tenga.  Document Released: 12/18/2006 Document Revised: 04/06/2018 Document Reviewed: 04/06/2018  Elsevier Patient Education © 2020 [employer]

## 2021-09-09 NOTE — PROGRESS NOTES
Odilon Raygoza is a 61 y.o. male who presents for Vertigo (has had vertigo for about 10 years. but recetly has been feeling dissy. he thinks it might be vertigo again. )      HPI this new problem.  Odilon is a 61-year-old male patient presents with vertigo.  He has been experiencing some dizziness for the past week and a half.  It occurs only when he changes positions.  It is worse when he gets up in the morning and the room is dark.  He tries to stand up slowly but feels dizzy until he turns a light on.  Once the light goes on he feels good again.  He reports that the dizziness only lasts seconds.  He has learned to move slowly.  He had vertigo about 10 years ago and this feels similar to him.  Sometimes he feels like he has to lean against the wall especially when he is showering so that he does not feel dizzy.  Reports washing his hair makes him feel really dizzy.  He denies vision change reports that his vision is normal and bad how it usually is.  He denies chest pain, headache, sinus pain or pressure, fever, focal weakness.  He is accompanied by his son today.  No other aggravating or alleviating factors.  Past medical history + hypertension.  No recent change in his medications.  No unusual activity.      Review of Systems   Constitutional: Negative for chills, fever and malaise/fatigue.   Respiratory: Negative for cough.    Cardiovascular: Negative for chest pain.   Gastrointestinal: Negative for abdominal pain, diarrhea, nausea and vomiting.   Musculoskeletal: Negative for neck pain.   Neurological: Positive for dizziness. Negative for sensory change and headaches.   Endo/Heme/Allergies: Negative for environmental allergies.   Psychiatric/Behavioral: Negative for substance abuse.       Allergies:     No Known Allergies    PMSFS Hx:  Past Medical History:   Diagnosis Date   • Diabetes    • Hypertension      History reviewed. No pertinent surgical history.  History reviewed. No pertinent family  "history.  Social History     Tobacco Use   • Smoking status: Never Smoker   Substance Use Topics   • Alcohol use: Yes     Comment: occ       Problems:   There is no problem list on file for this patient.      Medications:   Current Outpatient Medications on File Prior to Visit   Medication Sig Dispense Refill   • simvastatin (ZOCOR) 80 MG tablet Take 80 mg by mouth every evening.     • lisinopril (PRINIVIL) 20 MG TABS Take 20 mg by mouth every day.     • lorazepam (ATIVAN) 1 MG TABS Take 1 Tab by mouth every four hours as needed (vertigo). 20 Each 0   • Insulin Zinc Human (NOVOLIN L SC) Inject  as instructed.     • aspirin effervescent (EVGENY-SELTZER) 325 MG TBEF Take 1 Tab by mouth every 6 hours as needed.     • insulin detemir (LEVEMIR) 100 UNIT/ML SOLN Inject 10 Units as instructed every evening.     • promethazine (PHENERGAN) 25 MG TABS Take 1 Tab by mouth every 6 hours as needed for Nausea/Vomiting. 20 Each 0     No current facility-administered medications on file prior to visit.          Objective:     /78   Pulse 78   Temp 36.2 °C (97.2 °F) (Temporal)   Resp 18   Ht 1.778 m (5' 10\")   Wt 77.1 kg (170 lb)   SpO2 97%   BMI 24.39 kg/m²     Physical Exam  Vitals and nursing note reviewed.   Constitutional:       General: He is not in acute distress.     Appearance: Normal appearance. He is well-developed, well-groomed and normal weight. He is not ill-appearing or toxic-appearing.   HENT:      Head: Normocephalic.      Right Ear: Hearing, tympanic membrane, ear canal and external ear normal.      Left Ear: Hearing, tympanic membrane, ear canal and external ear normal.      Nose: Nose normal.      Mouth/Throat:      Lips: Pink.      Mouth: Mucous membranes are moist.      Pharynx: Oropharynx is clear.   Eyes:      Extraocular Movements: Extraocular movements intact.      Conjunctiva/sclera: Conjunctivae normal.      Pupils: Pupils are equal, round, and reactive to light.   Cardiovascular:      Rate and " Rhythm: Normal rate and regular rhythm.      Pulses: Normal pulses.      Heart sounds: Normal heart sounds.   Pulmonary:      Effort: Pulmonary effort is normal. No accessory muscle usage.      Breath sounds: Normal breath sounds.   Musculoskeletal:         General: Normal range of motion.      Cervical back: Normal range of motion. No rigidity.   Lymphadenopathy:      Cervical: No cervical adenopathy.      Upper Body:      Right upper body: No supraclavicular adenopathy.      Left upper body: No supraclavicular adenopathy.   Skin:     General: Skin is warm.      Capillary Refill: Capillary refill takes less than 2 seconds.   Neurological:      General: No focal deficit present.      Mental Status: He is alert and oriented to person, place, and time.      Cranial Nerves: No cranial nerve deficit.      Sensory: Sensation is intact. No sensory deficit.      Motor: No weakness.      Coordination: Coordination is intact. Romberg sign negative. Coordination normal.      Gait: Gait is intact. Gait normal.      Deep Tendon Reflexes: Reflexes normal.   Psychiatric:         Mood and Affect: Mood normal.         Behavior: Behavior is uncooperative.         Thought Content: Thought content normal.         Judgment: Judgment normal.         Assessment /Associated Orders:      1. Benign paroxysmal positional vertigo, unspecified laterality  meclizine (ANTIVERT) 25 MG Tab    AMB REFERRAL TO ESTABLISH WITH [facility] PCP       Medical Decision Making:    Pt is clinically stable at today's acute urgent care visit.  No acute distress noted. Appropriate for outpatient management at this time.   Acute problem today with uncertain prognosis.     Educated to walk while looking up - do not look down while walking.   Change positions slowly.   Keep well hydrated  Educated in proper administration of medication(s) ordered today including safety, possible SE, risks, benefits, rationale and alternatives to therapy.   Educated that meclizine has  a sedative effect.  He should not drive or drink alcohol while he is dizzy.    Recommend follow-up with his primary care provider.    Advised to follow-up with the primary care provider for recheck, reevaluation, and consideration of further management if necessary.   Discussed management options (risks,benefits, and alternatives to treatment). Expressed understanding and the treatment plan was agreed upon. Questions were encouraged and answered       Return to urgent care prn if new or worsening sx or if there is no improvement in condition prn.  Educated in Red flags and indications to immediately call 911 or present to the Emergency Department.     I personally reviewed prior external notes and test results pertinent to today's visit.  I have independently reviewed and interpreted all diagnostics ordered during this urgent care acute visit.   Time spent evaluating this patient was at least 30 minutes and includes preparing for visit, counseling/education, exam and evaluation, obtaining history, independent interpretation, ordering lab/test/procedures,medication management and documentation.Time does not include separately billable procedures noted .

## 2021-09-21 ENCOUNTER — IMMUNIZATION (OUTPATIENT)
Dept: OCCUPATIONAL MEDICINE | Facility: CLINIC | Age: 61
End: 2021-09-21
Payer: COMMERCIAL

## 2021-09-21 DIAGNOSIS — Z23 NEED FOR VACCINATION: Primary | ICD-10-CM

## 2021-09-24 PROCEDURE — 90686 IIV4 VACC NO PRSV 0.5 ML IM: CPT | Performed by: PREVENTIVE MEDICINE

## 2022-01-13 ENCOUNTER — PHARMACY VISIT (OUTPATIENT)
Dept: PHARMACY | Facility: MEDICAL CENTER | Age: 62
End: 2022-01-13
Payer: COMMERCIAL

## 2022-01-13 ENCOUNTER — APPOINTMENT (OUTPATIENT)
Dept: PHARMACY | Facility: MEDICAL CENTER | Age: 62
End: 2022-01-13
Payer: COMMERCIAL

## 2022-01-13 PROCEDURE — RXMED WILLOW AMBULATORY MEDICATION CHARGE: Performed by: INTERNAL MEDICINE

## 2022-01-13 RX ORDER — CX-024414 0.2 MG/ML
0.25 INJECTION, SUSPENSION INTRAMUSCULAR
Qty: 0.25 ML | Refills: 0 | OUTPATIENT
Start: 2022-01-13

## 2022-09-27 ENCOUNTER — IMMUNIZATION (OUTPATIENT)
Dept: OCCUPATIONAL MEDICINE | Facility: CLINIC | Age: 62
End: 2022-09-27
Payer: COMMERCIAL

## 2022-09-27 DIAGNOSIS — Z23 NEED FOR VACCINATION: Primary | ICD-10-CM

## 2022-09-27 PROCEDURE — 90686 IIV4 VACC NO PRSV 0.5 ML IM: CPT | Performed by: PREVENTIVE MEDICINE

## 2022-10-10 ENCOUNTER — HOSPITAL ENCOUNTER (OUTPATIENT)
Dept: LAB | Facility: MEDICAL CENTER | Age: 62
End: 2022-10-10
Attending: PHYSICIAN ASSISTANT
Payer: COMMERCIAL

## 2022-10-10 LAB
ALBUMIN SERPL BCP-MCNC: 4 G/DL (ref 3.2–4.9)
ALBUMIN/GLOB SERPL: 1.4 G/DL
ALP SERPL-CCNC: 67 U/L (ref 30–99)
ALT SERPL-CCNC: 16 U/L (ref 2–50)
ANION GAP SERPL CALC-SCNC: 9 MMOL/L (ref 7–16)
AST SERPL-CCNC: 24 U/L (ref 12–45)
BILIRUB SERPL-MCNC: 0.3 MG/DL (ref 0.1–1.5)
BUN SERPL-MCNC: 26 MG/DL (ref 8–22)
CALCIUM SERPL-MCNC: 9.3 MG/DL (ref 8.5–10.5)
CHLORIDE SERPL-SCNC: 103 MMOL/L (ref 96–112)
CHOLEST SERPL-MCNC: 153 MG/DL (ref 100–199)
CO2 SERPL-SCNC: 26 MMOL/L (ref 20–33)
CREAT SERPL-MCNC: 0.9 MG/DL (ref 0.5–1.4)
FASTING STATUS PATIENT QL REPORTED: NORMAL
GFR SERPLBLD CREATININE-BSD FMLA CKD-EPI: 96 ML/MIN/1.73 M 2
GLOBULIN SER CALC-MCNC: 2.8 G/DL (ref 1.9–3.5)
GLUCOSE SERPL-MCNC: 94 MG/DL (ref 65–99)
HDLC SERPL-MCNC: 56 MG/DL
LDLC SERPL CALC-MCNC: 73 MG/DL
POTASSIUM SERPL-SCNC: 4.3 MMOL/L (ref 3.6–5.5)
PROT SERPL-MCNC: 6.8 G/DL (ref 6–8.2)
SODIUM SERPL-SCNC: 138 MMOL/L (ref 135–145)
TRIGL SERPL-MCNC: 120 MG/DL (ref 0–149)

## 2022-10-10 PROCEDURE — 80061 LIPID PANEL: CPT

## 2022-10-10 PROCEDURE — 36415 COLL VENOUS BLD VENIPUNCTURE: CPT

## 2022-10-10 PROCEDURE — 80053 COMPREHEN METABOLIC PANEL: CPT

## 2023-03-15 ENCOUNTER — HOSPITAL ENCOUNTER (OUTPATIENT)
Dept: LAB | Facility: MEDICAL CENTER | Age: 63
End: 2023-03-15
Attending: PHYSICIAN ASSISTANT
Payer: COMMERCIAL

## 2023-03-15 LAB
ANION GAP SERPL CALC-SCNC: 11 MMOL/L (ref 7–16)
BASOPHILS # BLD AUTO: 0.7 % (ref 0–1.8)
BASOPHILS # BLD: 0.04 K/UL (ref 0–0.12)
BUN SERPL-MCNC: 27 MG/DL (ref 8–22)
CALCIUM SERPL-MCNC: 9.4 MG/DL (ref 8.5–10.5)
CHLORIDE SERPL-SCNC: 102 MMOL/L (ref 96–112)
CHOLEST SERPL-MCNC: 148 MG/DL (ref 100–199)
CO2 SERPL-SCNC: 23 MMOL/L (ref 20–33)
CREAT SERPL-MCNC: 0.96 MG/DL (ref 0.5–1.4)
EOSINOPHIL # BLD AUTO: 0.26 K/UL (ref 0–0.51)
EOSINOPHIL NFR BLD: 4.3 % (ref 0–6.9)
ERYTHROCYTE [DISTWIDTH] IN BLOOD BY AUTOMATED COUNT: 46 FL (ref 35.9–50)
GFR SERPLBLD CREATININE-BSD FMLA CKD-EPI: 88 ML/MIN/1.73 M 2
GLUCOSE SERPL-MCNC: 118 MG/DL (ref 65–99)
HCT VFR BLD AUTO: 32.8 % (ref 42–52)
HDLC SERPL-MCNC: 48 MG/DL
HGB BLD-MCNC: 11 G/DL (ref 14–18)
IMM GRANULOCYTES # BLD AUTO: 0.01 K/UL (ref 0–0.11)
IMM GRANULOCYTES NFR BLD AUTO: 0.2 % (ref 0–0.9)
LDLC SERPL CALC-MCNC: 82 MG/DL
LYMPHOCYTES # BLD AUTO: 2 K/UL (ref 1–4.8)
LYMPHOCYTES NFR BLD: 33.2 % (ref 22–41)
MCH RBC QN AUTO: 30.7 PG (ref 27–33)
MCHC RBC AUTO-ENTMCNC: 33.5 G/DL (ref 33.7–35.3)
MCV RBC AUTO: 91.6 FL (ref 81.4–97.8)
MONOCYTES # BLD AUTO: 0.47 K/UL (ref 0–0.85)
MONOCYTES NFR BLD AUTO: 7.8 % (ref 0–13.4)
NEUTROPHILS # BLD AUTO: 3.24 K/UL (ref 1.82–7.42)
NEUTROPHILS NFR BLD: 53.8 % (ref 44–72)
NRBC # BLD AUTO: 0 K/UL
NRBC BLD-RTO: 0 /100 WBC
PLATELET # BLD AUTO: 237 K/UL (ref 164–446)
PMV BLD AUTO: 11.8 FL (ref 9–12.9)
POTASSIUM SERPL-SCNC: 4.2 MMOL/L (ref 3.6–5.5)
RBC # BLD AUTO: 3.58 M/UL (ref 4.7–6.1)
SODIUM SERPL-SCNC: 136 MMOL/L (ref 135–145)
TRIGL SERPL-MCNC: 91 MG/DL (ref 0–149)
WBC # BLD AUTO: 6 K/UL (ref 4.8–10.8)

## 2023-03-15 PROCEDURE — 80061 LIPID PANEL: CPT

## 2023-03-15 PROCEDURE — 85025 COMPLETE CBC W/AUTO DIFF WBC: CPT

## 2023-03-15 PROCEDURE — 80048 BASIC METABOLIC PNL TOTAL CA: CPT

## 2023-03-15 PROCEDURE — 36415 COLL VENOUS BLD VENIPUNCTURE: CPT

## 2023-09-28 ENCOUNTER — IMMUNIZATION (OUTPATIENT)
Dept: OCCUPATIONAL MEDICINE | Facility: CLINIC | Age: 63
End: 2023-09-28

## 2023-09-28 DIAGNOSIS — Z23 IMMUNIZATION DUE: ICD-10-CM

## 2023-09-28 PROCEDURE — 90686 IIV4 VACC NO PRSV 0.5 ML IM: CPT | Performed by: NURSE PRACTITIONER

## 2024-09-04 ENCOUNTER — HOSPITAL ENCOUNTER (OUTPATIENT)
Dept: LAB | Facility: MEDICAL CENTER | Age: 64
End: 2024-09-04
Payer: COMMERCIAL

## 2024-09-04 LAB
ALBUMIN SERPL BCP-MCNC: 3.8 G/DL (ref 3.2–4.9)
ALBUMIN/GLOB SERPL: 1.3 G/DL
ALP SERPL-CCNC: 67 U/L (ref 30–99)
ALT SERPL-CCNC: 16 U/L (ref 2–50)
ANION GAP SERPL CALC-SCNC: 10 MMOL/L (ref 7–16)
AST SERPL-CCNC: 21 U/L (ref 12–45)
BILIRUB SERPL-MCNC: 0.2 MG/DL (ref 0.1–1.5)
BUN SERPL-MCNC: 22 MG/DL (ref 8–22)
CALCIUM ALBUM COR SERPL-MCNC: 9.5 MG/DL (ref 8.5–10.5)
CALCIUM SERPL-MCNC: 9.3 MG/DL (ref 8.5–10.5)
CHLORIDE SERPL-SCNC: 103 MMOL/L (ref 96–112)
CHOLEST SERPL-MCNC: 135 MG/DL (ref 100–199)
CO2 SERPL-SCNC: 24 MMOL/L (ref 20–33)
CREAT SERPL-MCNC: 0.91 MG/DL (ref 0.5–1.4)
FASTING STATUS PATIENT QL REPORTED: NORMAL
GFR SERPLBLD CREATININE-BSD FMLA CKD-EPI: 93 ML/MIN/1.73 M 2
GLOBULIN SER CALC-MCNC: 3 G/DL (ref 1.9–3.5)
GLUCOSE SERPL-MCNC: 89 MG/DL (ref 65–99)
HCV AB SER QL: NORMAL
HDLC SERPL-MCNC: 52 MG/DL
LDLC SERPL CALC-MCNC: 69 MG/DL
POTASSIUM SERPL-SCNC: 4.5 MMOL/L (ref 3.6–5.5)
PROT SERPL-MCNC: 6.8 G/DL (ref 6–8.2)
SODIUM SERPL-SCNC: 137 MMOL/L (ref 135–145)
TRIGL SERPL-MCNC: 68 MG/DL (ref 0–149)
TSH SERPL DL<=0.005 MIU/L-ACNC: 2.82 UIU/ML (ref 0.38–5.33)

## 2024-09-04 PROCEDURE — 36415 COLL VENOUS BLD VENIPUNCTURE: CPT

## 2024-09-04 PROCEDURE — 80053 COMPREHEN METABOLIC PANEL: CPT

## 2024-09-04 PROCEDURE — 84443 ASSAY THYROID STIM HORMONE: CPT

## 2024-09-04 PROCEDURE — 80061 LIPID PANEL: CPT

## 2024-09-04 PROCEDURE — 86803 HEPATITIS C AB TEST: CPT

## 2024-09-24 ENCOUNTER — IMMUNIZATION (OUTPATIENT)
Dept: OCCUPATIONAL MEDICINE | Facility: CLINIC | Age: 64
End: 2024-09-24

## 2024-09-24 DIAGNOSIS — Z23 NEED FOR VACCINATION: Primary | ICD-10-CM

## 2025-05-02 ENCOUNTER — HOSPITAL ENCOUNTER (OUTPATIENT)
Dept: LAB | Facility: MEDICAL CENTER | Age: 65
End: 2025-05-02
Payer: COMMERCIAL

## 2025-05-02 LAB
ALBUMIN SERPL BCP-MCNC: 4 G/DL (ref 3.2–4.9)
ALBUMIN/GLOB SERPL: 1.4 G/DL
ALP SERPL-CCNC: 60 U/L (ref 30–99)
ALT SERPL-CCNC: 40 U/L (ref 2–50)
ANION GAP SERPL CALC-SCNC: 7 MMOL/L (ref 7–16)
AST SERPL-CCNC: 32 U/L (ref 12–45)
BASOPHILS # BLD AUTO: 0.7 % (ref 0–1.8)
BASOPHILS # BLD: 0.05 K/UL (ref 0–0.12)
BILIRUB SERPL-MCNC: 0.2 MG/DL (ref 0.1–1.5)
BUN SERPL-MCNC: 28 MG/DL (ref 8–22)
CALCIUM ALBUM COR SERPL-MCNC: 9.4 MG/DL (ref 8.5–10.5)
CALCIUM SERPL-MCNC: 9.4 MG/DL (ref 8.5–10.5)
CHLORIDE SERPL-SCNC: 105 MMOL/L (ref 96–112)
CHOLEST SERPL-MCNC: 141 MG/DL (ref 100–199)
CO2 SERPL-SCNC: 28 MMOL/L (ref 20–33)
CREAT SERPL-MCNC: 1.09 MG/DL (ref 0.5–1.4)
EOSINOPHIL # BLD AUTO: 0.31 K/UL (ref 0–0.51)
EOSINOPHIL NFR BLD: 4.6 % (ref 0–6.9)
ERYTHROCYTE [DISTWIDTH] IN BLOOD BY AUTOMATED COUNT: 48.9 FL (ref 35.9–50)
GFR SERPLBLD CREATININE-BSD FMLA CKD-EPI: 75 ML/MIN/1.73 M 2
GLOBULIN SER CALC-MCNC: 2.9 G/DL (ref 1.9–3.5)
GLUCOSE SERPL-MCNC: 87 MG/DL (ref 65–99)
HCT VFR BLD AUTO: 33.7 % (ref 42–52)
HDLC SERPL-MCNC: 53 MG/DL
HGB BLD-MCNC: 11.1 G/DL (ref 14–18)
IMM GRANULOCYTES # BLD AUTO: 0.01 K/UL (ref 0–0.11)
IMM GRANULOCYTES NFR BLD AUTO: 0.1 % (ref 0–0.9)
LDLC SERPL CALC-MCNC: 73 MG/DL
LYMPHOCYTES # BLD AUTO: 2.11 K/UL (ref 1–4.8)
LYMPHOCYTES NFR BLD: 31.6 % (ref 22–41)
MCH RBC QN AUTO: 30.8 PG (ref 27–33)
MCHC RBC AUTO-ENTMCNC: 32.9 G/DL (ref 32.3–36.5)
MCV RBC AUTO: 93.6 FL (ref 81.4–97.8)
MONOCYTES # BLD AUTO: 0.53 K/UL (ref 0–0.85)
MONOCYTES NFR BLD AUTO: 7.9 % (ref 0–13.4)
NEUTROPHILS # BLD AUTO: 3.67 K/UL (ref 1.82–7.42)
NEUTROPHILS NFR BLD: 55.1 % (ref 44–72)
NRBC # BLD AUTO: 0 K/UL
NRBC BLD-RTO: 0 /100 WBC (ref 0–0.2)
PLATELET # BLD AUTO: 240 K/UL (ref 164–446)
PMV BLD AUTO: 11.9 FL (ref 9–12.9)
POTASSIUM SERPL-SCNC: 4.4 MMOL/L (ref 3.6–5.5)
PROT SERPL-MCNC: 6.9 G/DL (ref 6–8.2)
RBC # BLD AUTO: 3.6 M/UL (ref 4.7–6.1)
SODIUM SERPL-SCNC: 140 MMOL/L (ref 135–145)
TRIGL SERPL-MCNC: 73 MG/DL (ref 0–149)
WBC # BLD AUTO: 6.7 K/UL (ref 4.8–10.8)

## 2025-05-02 PROCEDURE — 80053 COMPREHEN METABOLIC PANEL: CPT

## 2025-05-02 PROCEDURE — 80061 LIPID PANEL: CPT

## 2025-05-02 PROCEDURE — 36415 COLL VENOUS BLD VENIPUNCTURE: CPT

## 2025-05-02 PROCEDURE — 85025 COMPLETE CBC W/AUTO DIFF WBC: CPT

## 2025-07-11 ENCOUNTER — HOSPITAL ENCOUNTER (OUTPATIENT)
Dept: LAB | Facility: MEDICAL CENTER | Age: 65
End: 2025-07-11
Payer: COMMERCIAL

## 2025-07-11 LAB
FERRITIN SERPL-MCNC: 33.1 NG/ML (ref 22–322)
IRON SATN MFR SERPL: 24 % (ref 15–55)
IRON SERPL-MCNC: 80 UG/DL (ref 50–180)
TIBC SERPL-MCNC: 340 UG/DL (ref 250–450)
UIBC SERPL-MCNC: 260 UG/DL (ref 110–370)

## 2025-07-11 PROCEDURE — 83550 IRON BINDING TEST: CPT

## 2025-07-11 PROCEDURE — 82728 ASSAY OF FERRITIN: CPT

## 2025-07-11 PROCEDURE — 36415 COLL VENOUS BLD VENIPUNCTURE: CPT

## 2025-07-11 PROCEDURE — 83540 ASSAY OF IRON: CPT
